# Patient Record
Sex: MALE | Race: WHITE | NOT HISPANIC OR LATINO
[De-identification: names, ages, dates, MRNs, and addresses within clinical notes are randomized per-mention and may not be internally consistent; named-entity substitution may affect disease eponyms.]

---

## 2019-02-04 ENCOUNTER — FORM ENCOUNTER (OUTPATIENT)
Age: 74
End: 2019-02-04

## 2019-02-05 ENCOUNTER — INPATIENT (INPATIENT)
Facility: HOSPITAL | Age: 74
LOS: 2 days | Discharge: HOME HEALTH | DRG: 247 | End: 2019-02-08
Attending: HOSPITALIST | Admitting: STUDENT IN AN ORGANIZED HEALTH CARE EDUCATION/TRAINING PROGRAM
Payer: MEDICARE

## 2019-02-05 VITALS
OXYGEN SATURATION: 97 % | HEART RATE: 43 BPM | SYSTOLIC BLOOD PRESSURE: 183 MMHG | TEMPERATURE: 98 F | DIASTOLIC BLOOD PRESSURE: 90 MMHG | HEIGHT: 67 IN | WEIGHT: 179.9 LBS | RESPIRATION RATE: 18 BRPM

## 2019-02-05 DIAGNOSIS — B34.2 CORONAVIRUS INFECTION, UNSPECIFIED: ICD-10-CM

## 2019-02-05 DIAGNOSIS — I49.9 CARDIAC ARRHYTHMIA, UNSPECIFIED: ICD-10-CM

## 2019-02-05 DIAGNOSIS — Z29.9 ENCOUNTER FOR PROPHYLACTIC MEASURES, UNSPECIFIED: ICD-10-CM

## 2019-02-05 DIAGNOSIS — M10.9 GOUT, UNSPECIFIED: ICD-10-CM

## 2019-02-05 DIAGNOSIS — Z98.89 OTHER SPECIFIED POSTPROCEDURAL STATES: Chronic | ICD-10-CM

## 2019-02-05 DIAGNOSIS — C76.2 MALIGNANT NEOPLASM OF ABDOMEN: Chronic | ICD-10-CM

## 2019-02-05 DIAGNOSIS — G25.0 ESSENTIAL TREMOR: ICD-10-CM

## 2019-02-05 DIAGNOSIS — I10 ESSENTIAL (PRIMARY) HYPERTENSION: ICD-10-CM

## 2019-02-05 DIAGNOSIS — M48.061 SPINAL STENOSIS, LUMBAR REGION WITHOUT NEUROGENIC CLAUDICATION: ICD-10-CM

## 2019-02-05 LAB
ALBUMIN SERPL ELPH-MCNC: 3.9 G/DL — SIGNIFICANT CHANGE UP (ref 3.3–5)
ALP SERPL-CCNC: 148 U/L — HIGH (ref 40–120)
ALT FLD-CCNC: 17 U/L — SIGNIFICANT CHANGE UP (ref 10–45)
ANION GAP SERPL CALC-SCNC: 14 MMOL/L — SIGNIFICANT CHANGE UP (ref 5–17)
APPEARANCE UR: CLEAR — SIGNIFICANT CHANGE UP
AST SERPL-CCNC: 21 U/L — SIGNIFICANT CHANGE UP (ref 10–40)
BILIRUB SERPL-MCNC: 0.3 MG/DL — SIGNIFICANT CHANGE UP (ref 0.2–1.2)
BILIRUB UR-MCNC: NEGATIVE — SIGNIFICANT CHANGE UP
BUN SERPL-MCNC: 14 MG/DL — SIGNIFICANT CHANGE UP (ref 7–23)
CALCIUM SERPL-MCNC: 8.9 MG/DL — SIGNIFICANT CHANGE UP (ref 8.4–10.5)
CHLORIDE SERPL-SCNC: 98 MMOL/L — SIGNIFICANT CHANGE UP (ref 96–108)
CO2 SERPL-SCNC: 25 MMOL/L — SIGNIFICANT CHANGE UP (ref 22–31)
COLOR SPEC: SIGNIFICANT CHANGE UP
CREAT SERPL-MCNC: 1.02 MG/DL — SIGNIFICANT CHANGE UP (ref 0.5–1.3)
DIFF PNL FLD: NEGATIVE — SIGNIFICANT CHANGE UP
GLUCOSE SERPL-MCNC: 108 MG/DL — HIGH (ref 70–99)
GLUCOSE UR QL: NEGATIVE — SIGNIFICANT CHANGE UP
HCOV NL63 RNA SPEC QL NAA+PROBE: DETECTED
HCOV PNL SPEC NAA+PROBE: DETECTED
HCT VFR BLD CALC: 43.8 % — SIGNIFICANT CHANGE UP (ref 39–50)
HGB BLD-MCNC: 14.8 G/DL — SIGNIFICANT CHANGE UP (ref 13–17)
KETONES UR-MCNC: NEGATIVE — SIGNIFICANT CHANGE UP
LEUKOCYTE ESTERASE UR-ACNC: NEGATIVE — SIGNIFICANT CHANGE UP
MCHC RBC-ENTMCNC: 32.3 PG — SIGNIFICANT CHANGE UP (ref 27–34)
MCHC RBC-ENTMCNC: 33.8 GM/DL — SIGNIFICANT CHANGE UP (ref 32–36)
MCV RBC AUTO: 95.6 FL — SIGNIFICANT CHANGE UP (ref 80–100)
NITRITE UR-MCNC: NEGATIVE — SIGNIFICANT CHANGE UP
NT-PROBNP SERPL-SCNC: 391 PG/ML — HIGH (ref 0–300)
PH UR: 6.5 — SIGNIFICANT CHANGE UP (ref 5–8)
PLATELET # BLD AUTO: 197 K/UL — SIGNIFICANT CHANGE UP (ref 150–400)
POTASSIUM SERPL-MCNC: 4.1 MMOL/L — SIGNIFICANT CHANGE UP (ref 3.5–5.3)
POTASSIUM SERPL-SCNC: 4.1 MMOL/L — SIGNIFICANT CHANGE UP (ref 3.5–5.3)
PROT SERPL-MCNC: 7.1 G/DL — SIGNIFICANT CHANGE UP (ref 6–8.3)
PROT UR-MCNC: SIGNIFICANT CHANGE UP
RAPID RVP RESULT: DETECTED
RBC # BLD: 4.58 M/UL — SIGNIFICANT CHANGE UP (ref 4.2–5.8)
RBC # FLD: 13.1 % — SIGNIFICANT CHANGE UP (ref 10.3–14.5)
SODIUM SERPL-SCNC: 137 MMOL/L — SIGNIFICANT CHANGE UP (ref 135–145)
SP GR SPEC: 1.01 — SIGNIFICANT CHANGE UP (ref 1.01–1.02)
TROPONIN T, HIGH SENSITIVITY RESULT: 26 NG/L — SIGNIFICANT CHANGE UP (ref 0–51)
UROBILINOGEN FLD QL: NEGATIVE — SIGNIFICANT CHANGE UP
WBC # BLD: 10.7 K/UL — HIGH (ref 3.8–10.5)
WBC # FLD AUTO: 10.7 K/UL — HIGH (ref 3.8–10.5)

## 2019-02-05 PROCEDURE — 71046 X-RAY EXAM CHEST 2 VIEWS: CPT | Mod: 26

## 2019-02-05 PROCEDURE — 93010 ELECTROCARDIOGRAM REPORT: CPT

## 2019-02-05 PROCEDURE — 99223 1ST HOSP IP/OBS HIGH 75: CPT | Mod: GC

## 2019-02-05 PROCEDURE — 99285 EMERGENCY DEPT VISIT HI MDM: CPT | Mod: 25

## 2019-02-05 RX ORDER — GABAPENTIN 400 MG/1
1200 CAPSULE ORAL THREE TIMES A DAY
Qty: 0 | Refills: 0 | Status: DISCONTINUED | OUTPATIENT
Start: 2019-02-05 | End: 2019-02-08

## 2019-02-05 RX ORDER — PRIMIDONE 250 MG/1
250 TABLET ORAL THREE TIMES A DAY
Qty: 0 | Refills: 0 | Status: DISCONTINUED | OUTPATIENT
Start: 2019-02-05 | End: 2019-02-08

## 2019-02-05 RX ORDER — TAMSULOSIN HYDROCHLORIDE 0.4 MG/1
0.4 CAPSULE ORAL AT BEDTIME
Qty: 0 | Refills: 0 | Status: DISCONTINUED | OUTPATIENT
Start: 2019-02-05 | End: 2019-02-08

## 2019-02-05 RX ORDER — ACETAMINOPHEN 500 MG
650 TABLET ORAL EVERY 6 HOURS
Qty: 0 | Refills: 0 | Status: DISCONTINUED | OUTPATIENT
Start: 2019-02-05 | End: 2019-02-08

## 2019-02-05 RX ORDER — ALLOPURINOL 300 MG
300 TABLET ORAL DAILY
Qty: 0 | Refills: 0 | Status: DISCONTINUED | OUTPATIENT
Start: 2019-02-05 | End: 2019-02-08

## 2019-02-05 RX ORDER — ENOXAPARIN SODIUM 100 MG/ML
40 INJECTION SUBCUTANEOUS EVERY 24 HOURS
Qty: 0 | Refills: 0 | Status: DISCONTINUED | OUTPATIENT
Start: 2019-02-05 | End: 2019-02-08

## 2019-02-05 RX ORDER — DULOXETINE HYDROCHLORIDE 30 MG/1
60 CAPSULE, DELAYED RELEASE ORAL DAILY
Qty: 0 | Refills: 0 | Status: DISCONTINUED | OUTPATIENT
Start: 2019-02-05 | End: 2019-02-08

## 2019-02-05 RX ORDER — ATORVASTATIN CALCIUM 80 MG/1
40 TABLET, FILM COATED ORAL AT BEDTIME
Qty: 0 | Refills: 0 | Status: DISCONTINUED | OUTPATIENT
Start: 2019-02-05 | End: 2019-02-08

## 2019-02-05 RX ADMIN — GABAPENTIN 1200 MILLIGRAM(S): 400 CAPSULE ORAL at 22:05

## 2019-02-05 RX ADMIN — ATORVASTATIN CALCIUM 40 MILLIGRAM(S): 80 TABLET, FILM COATED ORAL at 22:05

## 2019-02-05 RX ADMIN — ENOXAPARIN SODIUM 40 MILLIGRAM(S): 100 INJECTION SUBCUTANEOUS at 22:06

## 2019-02-05 RX ADMIN — PRIMIDONE 250 MILLIGRAM(S): 250 TABLET ORAL at 22:05

## 2019-02-05 RX ADMIN — Medication 300 MILLIGRAM(S): at 22:06

## 2019-02-05 RX ADMIN — TAMSULOSIN HYDROCHLORIDE 0.4 MILLIGRAM(S): 0.4 CAPSULE ORAL at 22:06

## 2019-02-05 NOTE — H&P ADULT - NSHPPHYSICALEXAM_GEN_ALL_CORE
Vital Signs Last 24 Hrs  T(C): 36.6 (05 Feb 2019 20:55), Max: 36.8 (05 Feb 2019 13:30)  T(F): 97.9 (05 Feb 2019 20:55), Max: 98.2 (05 Feb 2019 13:30)  HR: 77 (05 Feb 2019 20:55) (43 - 83)  BP: 135/93 (05 Feb 2019 20:55) (126/63 - 183/90)  BP(mean): --  RR: 15 (05 Feb 2019 20:55) (15 - 18)  SpO2: 98% (05 Feb 2019 20:55) (97% - 100%)  CAPILLARY BLOOD GLUCOSE        I&O's Summary      PHYSICAL EXAM:  GENERAL: NAD, well-developed  HEAD:  Atraumatic, Normocephalic  EYES: EOMI, PERRLA, conjunctiva and sclera clear  ENT: mild pharyngeal erythema, no cervical lymphadenopathy  NECK: Supple, No JVD  CHEST/LUNG: Clear to auscultation bilaterally; No wheeze  HEART: Normal rate, irregular rhythm; No murmurs, rubs, or gallops  ABDOMEN: Soft, Nontender, Nondistended; Bowel sounds present  EXTREMITIES:  2+ Peripheral Pulses, No clubbing, cyanosis, or edema  PSYCH: AAOx3  NEUROLOGY: non-focal, hypersensative to palpation right hand and leg 4/5 strength RU/RLE;  SKIN: No rashes or lesions Vital Signs Last 24 Hrs  T(C): 36.6 (05 Feb 2019 20:55), Max: 36.8 (05 Feb 2019 13:30)  T(F): 97.9 (05 Feb 2019 20:55), Max: 98.2 (05 Feb 2019 13:30)  HR: 77 (05 Feb 2019 20:55) (43 - 83)  BP: 135/93 (05 Feb 2019 20:55) (126/63 - 183/90)  BP(mean): --  RR: 15 (05 Feb 2019 20:55) (15 - 18)  SpO2: 98% (05 Feb 2019 20:55) (97% - 100%)  CAPILLARY BLOOD GLUCOSE        I&O's Summary      PHYSICAL EXAM:  GENERAL: NAD, well-developed  HEAD:  Atraumatic, Normocephalic  EYES: EOMI, PERRLA, conjunctiva and sclera clear  ENT: mild pharyngeal erythema, no cervical lymphadenopathy  NECK: Supple, No JVD  CHEST/LUNG: Clear to auscultation bilaterally; No wheeze  HEART: Normal rate, irregular rhythm; No murmurs, rubs, or gallops  ABDOMEN: Soft, Nontender, Nondistended; Bowel sounds present  EXTREMITIES:  2+ Peripheral Pulses, No clubbing, cyanosis, or edema  PSYCH: AAOx3  NEUROLOGY: non-focal, hypersensative to palpation right hand and leg 4/5 strength RU/RLE;  BacK: well healed surgical incision cervical and lumbar w/ palpable neuromodulators  SKIN: No rashes or lesions Vital Signs Last 24 Hrs  T(C): 36.6 (05 Feb 2019 20:55), Max: 36.8 (05 Feb 2019 13:30)  T(F): 97.9 (05 Feb 2019 20:55), Max: 98.2 (05 Feb 2019 13:30)  HR: 77 (05 Feb 2019 20:55) (43 - 83)  BP: 135/93 (05 Feb 2019 20:55) (126/63 - 183/90)  BP(mean): --  RR: 15 (05 Feb 2019 20:55) (15 - 18)  SpO2: 98% (05 Feb 2019 20:55) (97% - 100%)  CAPILLARY BLOOD GLUCOSE    I&O's Summary      PHYSICAL EXAM:  GENERAL: NAD, well-developed  HEAD:  Atraumatic, Normocephalic  EYES: EOMI, PERRLA, conjunctiva and sclera clear  ENT: mild pharyngeal erythema, no cervical lymphadenopathy  NECK: Supple, No JVD  CHEST/LUNG: Clear to auscultation bilaterally; No wheeze  HEART: Normal rate, irregular rhythm; No murmurs, rubs, or gallops  ABDOMEN: Soft, Nontender, Nondistended; Bowel sounds present  EXTREMITIES:  2+ Peripheral Pulses, No clubbing, cyanosis, or edema  PSYCH: AAOx3  NEUROLOGY: non-focal, hypersensative to palpation right hand and leg 4/5 strength RU/RLE;  BacK: well healed surgical incision cervical and lumbar w/ palpable neuromodulators  SKIN: No rashes or lesions Vital Signs Last 24 Hrs  T(C): 36.6 (05 Feb 2019 20:55), Max: 36.8 (05 Feb 2019 13:30)  T(F): 97.9 (05 Feb 2019 20:55), Max: 98.2 (05 Feb 2019 13:30)  HR: 77 (05 Feb 2019 20:55) (43 - 83)  BP: 135/93 (05 Feb 2019 20:55) (126/63 - 183/90)  BP(mean): --  RR: 15 (05 Feb 2019 20:55) (15 - 18)  SpO2: 98% (05 Feb 2019 20:55) (97% - 100%)  CAPILLARY BLOOD GLUCOSE    I&O's Summary      PHYSICAL EXAM:  GENERAL: NAD, well-developed  HEAD:  Atraumatic, Normocephalic  EYES: EOMI, PERRLA, conjunctiva and sclera clear  ENT: mild pharyngeal erythema, no cervical lymphadenopathy  NECK: Supple, No JVD  CHEST/LUNG: Clear to auscultation bilaterally; No wheeze  HEART: Normal rate, irregular rhythm; No murmurs, rubs, or gallops  ABDOMEN: Soft, Nontender, Nondistended; Bowel sounds present  EXTREMITIES:  2+ Peripheral Pulses, No clubbing, cyanosis, or edema  PSYCH: AAOx3  NEUROLOGY: non-focal, hypersensitive to palpation right hand and leg 4/5 strength RUE/RLE;  BacK: well healed surgical incision cervical and lumbar w/ palpable neuromodulators  SKIN: No rashes or lesions

## 2019-02-05 NOTE — H&P ADULT - PROBLEM SELECTOR PLAN 4
Continue home primidone Patient currently well controlled. unclear if he was taking HTN meds in prison.  -restart losartan as needed. Hold hctz in anticipation of cardiac catheterization.   -monitor BP

## 2019-02-05 NOTE — ED PROVIDER NOTE - CHIEF COMPLAINT
The patient is a 74y Male complaining of The patient is a 74y Male complaining of Left side chest pain

## 2019-02-05 NOTE — H&P ADULT - HISTORY OF PRESENT ILLNESS
74M with PMH of HTN, HLD, JOSSY who present for bradycardia. Patient states that he went to [facility] on Saturday because he was not feeling well with a cough, N/V and runny nose and was found to have a slow heart beat and so he was sent to [facility] for evaluation. While there he was found to have bigeminy and sent over for cardiac angio. The patient states that he has had intermittent palpitations with only 1 episode of chest pain while he was coughing which resolved after about 1 minute. He states that he has never had chest pain before and that there has been no change in his exercise tolerance, he usually walks back and forth around his cell.   He had been in nursing home for 3 months, states his cell mate is sick.   The patient has no history of CAD. His father  of an MI. He is a former smoker. Denies EtOH or drug use. Of note, patient recently underwent nuclear stress test at [facility]. Per chart, it showed a fixed anteroseptal defect and mild inferolateral ischemia.     In ED: 36.7; 43; 183/90->126/63; 97% on RA 74M with PMH of spinal stenosis s/p neuromodulators cervical and lumbar, gout, HTN, HLD, JOSSY who present for bradycardia. Patient states that he went to [facility] on Saturday because he was not feeling well with a cough, N/V and runny nose and was found to have a slow heart beat and so he was sent to [facility] for evaluation. While there he was found to have bigeminy and sent over for cardiac angio. The patient states that he has had intermittent palpitations with only 1 episode of chest pain while he was coughing which resolved after about 1 minute. He states that he has never had chest pain before and that there has been no change in his exercise tolerance, he usually walks back and forth around his cell.   He had been in correction for 3 months, states his cell mate is sick.   The patient has no history of CAD. His father  of an MI. He is a former smoker. Denies EtOH or drug use. Of note, patient recently underwent nuclear stress test at [facility]. Per chart, it showed a fixed anteroseptal defect and mild inferolateral ischemia.     In ED: 36.7; 43; 183/90->126/63; 97% on RA

## 2019-02-05 NOTE — H&P ADULT - ATTENDING COMMENTS
Patient assigned to me by night hospitalist in charge for management and care for patient for this evening only. Care to be resumed by day hospitalist in the morning and thereafter.    This patient is a 74yoM with PMH of spinal stenosis s/p neurostimulator, gout, htn, hLd, JOSSY who initially developed symptoms of cough and rhinorrhea, was found to have bradycardia, and reversible perfusion defects on nuclear stress test. Patient endorses nasal congestion. He denies CP, palpitations, lightheadedness at this time. On exam, pt is A&O x3, PERRL, EOMI, lungs CTAB, cardiac exam regularly irregular, abd soft and nontender, no peripheral edema.  Agree with plan above for left heart catheterization in AM to assess for vasoocclusive disease.

## 2019-02-05 NOTE — H&P ADULT - PROBLEM SELECTOR PLAN 6
Patient w/ hx of nerve stimulator on duloxetine and gabapentin for neuropathic pain  -continue home duloxetine and gabapentin Continue home allopurinol

## 2019-02-05 NOTE — ED PROVIDER NOTE - ATTENDING CONTRIBUTION TO CARE
Private Physician Referred To [facility]lam[facility] from [facility]  74y male pmh BPH, Gout, HTN.HLD,JOSSY nocompliant on CPAP, Pt comes to ed from [facility] in [place]/Prisoner. Pt had been admitted to [facility] last month.  DC home now comes to ed complains of "I've been very sick with a cold past several days with cough,nv" Had been seen again by md and dx as Lesly Had Sestamibi scan 1/2/19 Showing mild infrolateral ischemia. Pt referred to Ed for eval. Pt had left chest pain, nonraditating lasting few minutes. Now pain free. PE WDWN male awake normocephalic atraumatic chest clear anterior & posterior cv no rubs, gallops or murmurs, neruo no focal defects cv no rubs, gallops or murmurs   Jenaro Silva MD, Facep

## 2019-02-05 NOTE — H&P ADULT - PROBLEM SELECTOR PLAN 7
DVT: enoxaparin  Diet: DASH  Dispo: cath DVT: enoxaparin  Diet: DASH  Pending cath Patient w/ hx of nerve stimulator on duloxetine and gabapentin for neuropathic pain  -continue home duloxetine and gabapentin

## 2019-02-05 NOTE — H&P ADULT - PROBLEM SELECTOR PLAN 2
Patient w/ viral symptoms w/ RVP +, no leukocytosis and no signs of pna; states that [facility]  -will get CXR, if no focal consolidation will monitor off antibx  -likely viral syndrome  -symptoms management Patient w/ viral symptoms w/ RVP +, no leukocytosis and no signs of pna; states that he received 2 days of amoxicillin-clavulanate in [facility].  -likely viral syndrome  -symptoms management -monitor on tele  -cath in AM  - follow up cardiology recommendations

## 2019-02-05 NOTE — H&P ADULT - NSHPREVIEWOFSYSTEMS_GEN_ALL_CORE
REVIEW OF SYSTEMS:  Constitutional: [ X] negative [ ] fevers [ ] chills [ ] weight loss [ ] weight gain  HEENT: [ ] negative [ ] dry eyes [ ] eye irritation [ ] postnasal drip [X ] nasal congestion  CV: [ ] negative  [ ] chest pain [ ] orthopnea [X ] palpitations [ ] murmur  Resp: [ ] negative [X ] cough [ ] shortness of breath [ ] dyspnea [ ] wheezing [ ] sputum [ ] hemoptysis  GI: [ X] negative [ ] nausea [ ] vomiting [ ] diarrhea [ ] constipation [ ] abd pain [ ] dysphagia   : [X ] negative [ ] dysuria [ ] nocturia [ ] hematuria [ ] increased urinary frequency  Musculoskeletal: [ ] negative [ X] back pain [ ] myalgias [ ] arthralgias [ ] fracture  Skin: [ X] negative [ ] rash [ ] itch  Neurological: [ X] negative [ ] headache [ ] dizziness [ ] syncope [ ] weakness [ ] numbness  Psychiatric: [X ] negative [ ] anxiety [ ] depression  Endocrine: [X ] negative [ ] diabetes [ ] thyroid problem  Hematologic/Lymphatic: [ X] negative [ ] anemia [ ] bleeding problem  Allergic/Immunologic: [X ] negative [ ] itchy eyes [ ] nasal discharge [ ] hives [ ] angioedema  [ ] All other systems negative  [ ] Unable to assess ROS because ________

## 2019-02-05 NOTE — ED PROVIDER NOTE - PSH
Cervical disc disorder  anterior cervical diskectomy C6-C7 2001 2005 posterior cervical cecompression and foraminectomy fusion and plating  8/2005 anterior cervical diskectomy and removal of plate and replated with fustion   2007 cervical laminectomy  H/O Spinal Surgery  2007 Placement of battery pack for neurostimulator  History of laminectomy  2007 Cervical laminectomy for neurostimulator  History of lumbar laminectomy  2012 L4-L5  Hx of LASIK  6 years ago  Malignant melanoma of abdomen  2014 - s/p excision  Neuropathy of hand  s/p insertion of neurostimulator in 2007 secondary to neuropathy in the RIGHT wrist/forearm

## 2019-02-05 NOTE — H&P ADULT - PROBLEM SELECTOR PLAN 3
Patient currently well controlled. unclear if he was taking HTN meds in prison.  -restart HCTZ-losartan as needed  -monitor BP Patient currently well controlled. unclear if he was taking HTN meds in prison.  -restart losartan as needed. Hold hctz in anticipation of cardiac catheterization.   -monitor BP Patient w/ viral symptoms w/ RVP +, no leukocytosis and no signs of pna; states that he received 2 days of amoxicillin-clavulanate in [facility].  -likely viral syndrome  -symptoms management

## 2019-02-05 NOTE — H&P ADULT - NSHPLABSRESULTS_GEN_ALL_CORE
LABS:                        14.8   10.7  )-----------( 197      ( 2019 13:50 )             43.8     02-    137  |  98  |  14  ----------------------------<  108<H>  4.1   |  25  |  1.02    Ca    8.9      2019 13:50    TPro  7.1  /  Alb  3.9  /  TBili  0.3  /  DBili  x   /  AST  21  /  ALT  17  /  AlkPhos  148<H>  02-05          Urinalysis Basic - ( 2019 17:57 )    Color: Light Yellow / Appearance: Clear / S.010 / pH: x  Gluc: x / Ketone: Negative  / Bili: Negative / Urobili: Negative   Blood: x / Protein: Trace / Nitrite: Negative   Leuk Esterase: Negative / RBC: x / WBC x   Sq Epi: x / Non Sq Epi: x / Bacteria: x     EKG: sinus 87 bpm w/ bigeminy LABS:  Labs, EKG reviewed personally by me                14.8   10.7  )-----------( 197      ( 2019 13:50 )             43.8         137  |  98  |  14  ----------------------------<  108<H>  4.1   |  25  |  1.02    Ca    8.9      2019 13:50    TPro  7.1  /  Alb  3.9  /  TBili  0.3  /  DBili  x   /  AST  21  /  ALT  17  /  AlkPhos  148<H>        Urinalysis Basic - ( 2019 17:57 )    Color: Light Yellow / Appearance: Clear / S.010 / pH: x  Gluc: x / Ketone: Negative  / Bili: Negative / Urobili: Negative   Blood: x / Protein: Trace / Nitrite: Negative   Leuk Esterase: Negative / RBC: x / WBC x   Sq Epi: x / Non Sq Epi: x / Bacteria: x     EKG: sinus 87 bpm w/ bigeminy    < from: Xray Chest 2 Views PA/Lat (19 @ 14:26) >    INTERPRETATION:  CLINICAL INFORMATION: Chest pain.    TECHNIQUE: Frontal and lateral radiographs of the chest dated 2019   2:26 PM.    COMPARISON: Chest radiograph dated 2014.    FINDINGS:  Thin catheter projecting over the right hemithorax.  The lungs are clear.  No pleural effusions. No pneumothorax.  Redemonstrated elevation of the left hemidiaphragm, similar to prior.  Cardiac size is withinnormal limits.   No acute osseous abnormality. Cervical fixation hardware again noted.      IMPRESSION: Clear lungs. No pneumothorax.    < end of copied text >        Cardiology consult note reviewed by me.

## 2019-02-05 NOTE — CONSULT NOTE ADULT - ATTENDING COMMENTS
Patient interviewed and examined. I was physically present for the essential portions of the E/M service provided.  Chart reviewed and note edited where appropriate.  Case discussed with fellow.  Agree w/ Assessment and Plan as outlined.  VEBs do not perfuse and he may have an effective bradycardia. With normal ECG, doubt ischemic etiology.  Suspect papillary muscle or annular origin.   Agree w/ cath.   Ambulate on tele to see if VEBs suppress.   LIkely initial Rx w/ BB post -cath    Gaston Cho MD [facility]  Spectra:  [phone]  Office: [phone]

## 2019-02-05 NOTE — ED PROVIDER NOTE - OBJECTIVE STATEMENT
Pt is a 73YO Male PMH listed below presenting after feeling episode of L.sided CP this morning when he got up. Pt says that he had a similar episode 1 month ago for which he went to [facility]. Denies exercise intolerance, radiation of pain, difficulty breathing lying flat. Pain was dull in nature. Nothing makes it worse, but it randomly went away. Pt. says that he feels fine now. Says that he was there for 5 days. ECHO was done which showed LVEF 50% w/ mildly decreased global LV systolic funtion, Impaired relaxation of LV diastolic filling, trivial pericardial effusion, mild-mod tricuspid/aortic regurge, mild elevated Pulm artery systolic pressure. Pt also had a Sestamibi scan which showed focal anteroseptal predominantly fixed perfusion abnormality w/ adjacent area of mild foal anteroseptal ischemia. Mild inferolateral ischemia. On 2/3/19 pt had an EKG that showed PVC with bigeminy. Same reuslts were seen on EKG today. Since then he has felt fine other than a cold that he has had for the past 3 days. Says that he coughs, has a HA, difficulty breathing, congestion, fatigue, NV. Denies current CP, SOB, HA, dizziness, palpitations, NVD, urianry symptoms, abdominal pain, leg swelling.

## 2019-02-05 NOTE — ED PROVIDER NOTE - NS ED ROS FT
Review of Systems:   Constitutional:  Denies fever, weight loss  HEENT:  Denies blurry vision, visual disturbances, difficulty hearing, epistaxis, sore throat  Neck: Denies pain, stiffness  Respiratory: HPI  Cardiovascular: HPI  Gastrointestinal: Denies abdominal pain, nausea, vomiting, diarrhea, constipation, hematochezia or hematemesis  Genitourinary: Denies dysuria, hematuria, back pain, frequency, hesitancy.    Neurological: Denies headache, numbness, weakness  Skin: Denies itching, burning, rashes, lesions  Endocrine: Denies heat or cold intolerance  Musculoskeletal:  Denies joint pain or swelling.  Denies muscle, back or extremity pain  Psychiatric: Denies depression, anxiety, mood swings  Heme/Lymph: Denies enlarged glands, easy bruising, bleeding gums, hx of blood clots  Allergy:  Denies history of hives or eczema

## 2019-02-05 NOTE — CONSULT NOTE ADULT - SUBJECTIVE AND OBJECTIVE BOX
CHIEF COMPLAINT: chest pain    HISTORY OF PRESENT ILLNESS:      Allergies  No Known Allergies      MEDICATIONS:                  PAST MEDICAL & SURGICAL HISTORY:  Spondylolisthesis  Familial tremor  Obesity (BMI 30-39.9)  Drug dependence: Chronic pain - opioid dependence, has completed suboxone therapy  - no opioids for last 5 months  Ventral hernia  Cervical arthritis  HTN (hypertension)  Testosterone deficiency  BPH (benign prostatic hypertrophy)  Hyperlipidemia  Gout  JOSSY (obstructive sleep apnea): noncompliant with cpap  Spinal stenosis of lumbar region  Deviated septum  Nasal polyps  Malignant melanoma of abdomen: 2014 - s/p excision  History of lumbar laminectomy: 2012 L4-L5  H/O Spinal Surgery: 2007 Placement of battery pack for neurostimulator  History of laminectomy: 2007 Cervical laminectomy for neurostimulator  Hx of LASIK: 6 years ago  Cervical disc disorder: anterior cervical diskectomy C6-C7 2001 2005 posterior cervical cecompression and foraminectomy fusion and plating  8/2005 anterior cervical diskectomy and removal of plate and replated with fustion   2007 cervical laminectomy  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  Skin: no rashes.  Ophthalmologic: no blurred vision, no loss of vision. 	  ENT: no sore throat, rhinorrhea, sinus congestion.  Respiratory: no SOB, cough or wheeze.  Gastrointestinal:  no N/V/D, no melena/hematemesis/hematochezia.  Genitourinary: no dysuria/hesitancy or hematuria.  Musculoskeletal: no myalgias or arthralgias.  Neurological: no changes in vision or hearing, no lightheadedness/dizziness, no syncope/near syncope	  Psychiatric: no unusual stress/anxiety.   Hematology/Lymphatics: no unusual bleeding, bruising and no lymphadenopathy.  Endocrine: no unusual thirst.   All others negative except as stated above and in HPI.    PHYSICAL EXAM:  T(C): 36.7 (02-05-19 @ 18:00), Max: 36.8 (02-05-19 @ 13:30)  HR: 76 (02-05-19 @ 18:00) (43 - 83)  BP: 132/67 (02-05-19 @ 18:00) (126/63 - 183/90)  RR: 18 (02-05-19 @ 18:00) (18 - 18)  SpO2: 100% (02-05-19 @ 18:00) (97% - 100%)  Wt(kg): --  I&O's Summary      Appearance: no acute distress  HEENT:   Normal oral mucosa, PERRL, EOMI	  Lymphatic: No lymphadenopathy  Cardiovascular: RRR, Normal S1 S2, No JVD, No murmurs, No edema  Respiratory: Lungs clear to auscultation	  Psychiatry: A & O x 3, Mood & affect appropriate  Gastrointestinal:  Soft, Non-tender, + BS	  Skin: No rashes, No ecchymoses, No cyanosis	  Neurologic: Non-focal  Extremities: Normal range of motion, No clubbing, cyanosis or edema  Vascular: Peripheral pulses palpable 2+ bilaterally      LABS:	 	    CBC Full  -  ( 05 Feb 2019 13:50 )  WBC Count : 10.7 K/uL  Hemoglobin : 14.8 g/dL  Hematocrit : 43.8 %  Platelet Count - Automated : 197 K/uL  Mean Cell Volume : 95.6 fl  Mean Cell Hemoglobin : 32.3 pg  Mean Cell Hemoglobin Concentration : 33.8 gm/dL  Auto Neutrophil # : x  Auto Lymphocyte # : x  Auto Monocyte # : x  Auto Eosinophil # : x  Auto Basophil # : x  Auto Neutrophil % : x  Auto Lymphocyte % : x  Auto Monocyte % : x  Auto Eosinophil % : x  Auto Basophil % : x    02-05    137  |  98  |  14  ----------------------------<  108<H>  4.1   |  25  |  1.02    Ca    8.9      05 Feb 2019 13:50    TPro  7.1  /  Alb  3.9  /  TBili  0.3  /  DBili  x   /  AST  21  /  ALT  17  /  AlkPhos  148<H>  02-05      proBNP: Serum Pro-Brain Natriuretic Peptide: 391 pg/mL (02-05 @ 13:50)    Lipid Profile:   HgA1c:   TSH:       CARDIAC MARKERS:            TELEMETRY: 	    EKG:  	  RADIOLOGY:    	  ASSESSMENT/PLAN:    #Bigeminy  - Admit to [facility] bed, cardiology will follow  - Check TTE  - Will plan on [facility] 2/6 to rule out ischemia	      JOVANNY Ascencio  Cardiology Fellow   [id number] CHIEF COMPLAINT: chest pain    HISTORY OF PRESENT ILLNESS:  74M with PMH of HTN, HLD, JOSSY who presents from [facility] for evaluation of chest pain and abnormal EKG. Since  patient has had cough, rhinorrhea congestion, emesis and malaise. He notes having sick cellmates. Today he had a coughing episode and afterwards experienced a one minute episode of non-radiating chest pain that resolved spontaneously. He was evaluated in the care home and found to be bradycardic on exam. An EKG was performed and showed bigeminy and he was sent to the ED for further evaluation.     The patient has no history of CAD. His father  of an MI. He is a former smoker. Denies EtOH or drug use. Of note, patient recently underwent nuclear stress test at [facility]. Per chart, it showed a fixed anteroseptal defect and mild inferolateral ischemia.     Allergies  No Known Allergies    MEDICATIONS:      PAST MEDICAL & SURGICAL HISTORY:  Spondylolisthesis  Familial tremor  Obesity (BMI 30-39.9)  Drug dependence: Chronic pain - opioid dependence, has completed suboxone therapy  - no opioids for last 5 months  Ventral hernia  Cervical arthritis  HTN (hypertension)  Testosterone deficiency  BPH (benign prostatic hypertrophy)  Hyperlipidemia  Gout  JOSSY (obstructive sleep apnea): noncompliant with cpap  Spinal stenosis of lumbar region  Deviated septum  Nasal polyps  Malignant melanoma of abdomen:  - s/p excision  History of lumbar laminectomy:  L4-L5  H/O Spinal Surgery:  Placement of battery pack for neurostimulator  History of laminectomy:  Cervical laminectomy for neurostimulator  Hx of LASIK: 6 years ago  Cervical disc disorder: anterior cervical diskectomy C6-C7 2001 posterior cervical cecompression and foraminectomy fusion and plating  2005 anterior cervical diskectomy and removal of plate and replated with fustion    cervical laminectomy  Neuropathy of hand: s/p insertion of neurostimulator in  secondary to neuropathy in the RIGHT wrist/forearm      FAMILY HISTORY:  No pertinent family history in first degree relatives      SOCIAL HISTORY:    Non-smoker  Denies EtOH, illicit drugs    REVIEW OF SYSTEMS:  General: no fatigue/malaise, weight loss/gain.  Skin: no rashes.  Ophthalmologic: no blurred vision, no loss of vision. 	  ENT: no sore throat, rhinorrhea, sinus congestion.  Respiratory: see above  Gastrointestinal:  no N/V/D, no melena/hematemesis/hematochezia.  Genitourinary: no dysuria/hesitancy or hematuria.  Musculoskeletal: no myalgias or arthralgias.  Neurological: no changes in vision or hearing, no lightheadedness/dizziness, no syncope/near syncope	  Psychiatric: no unusual stress/anxiety.   Hematology/Lymphatics: no unusual bleeding, bruising and no lymphadenopathy.  Endocrine: no unusual thirst.   All others negative except as stated above and in HPI.    PHYSICAL EXAM:  T(C): 36.7 (19 @ 18:00), Max: 36.8 (19 @ 13:30)  HR: 76 (19 @ 18:00) (43 - 83)  BP: 132/67 (19 @ 18:00) (126/63 - 183/90)  RR: 18 (19 @ 18:00) (18 - 18)  SpO2: 100% (19 @ 18:00) (97% - 100%)  Wt(kg): --  I&O's Summary      Appearance: no acute distress  HEENT:   Normal oral mucosa, PERRL, EOMI	  Lymphatic: No lymphadenopathy  Cardiovascular: Regularly irregular, Normal S1 S2, No JVD, No murmurs, No edema  Respiratory: Lungs clear to auscultation	  Psychiatry: A & O x 3, Mood & affect appropriate  Gastrointestinal:  Soft, Non-tender, + BS	  Skin: No rashes, No ecchymoses, No cyanosis	  Neurologic: Non-focal  Extremities: Normal range of motion, No clubbing, cyanosis or edema  Vascular: Peripheral pulses palpable 2+ bilaterally      LABS:	 	    CBC Full  -  ( 2019 13:50 )  WBC Count : 10.7 K/uL  Hemoglobin : 14.8 g/dL  Hematocrit : 43.8 %  Platelet Count - Automated : 197 K/uL  Mean Cell Volume : 95.6 fl  Mean Cell Hemoglobin : 32.3 pg  Mean Cell Hemoglobin Concentration : 33.8 gm/dL  Auto Neutrophil # : x  Auto Lymphocyte # : x  Auto Monocyte # : x  Auto Eosinophil # : x  Auto Basophil # : x  Auto Neutrophil % : x  Auto Lymphocyte % : x  Auto Monocyte % : x  Auto Eosinophil % : x  Auto Basophil % : x        137  |  98  |  14  ----------------------------<  108<H>  4.1   |  25  |  1.02    Ca    8.9      2019 13:50    TPro  7.1  /  Alb  3.9  /  TBili  0.3  /  DBili  x   /  AST  21  /  ALT  17  /  AlkPhos  148<H>        proBNP: Serum Pro-Brain Natriuretic Peptide: 391 pg/mL ( @ 13:50)    Lipid Profile:   HgA1c:   TSH:       CARDIAC MARKERS:    HS trop 26    ProBNP 391       EKG: Bigeminy, no evidence of ischemia    RADIOLOGY:  < from: Xray Chest 2 Views PA/Lat (19 @ 14:26) >    IMPRESSION: Clear lungs. No pneumothorax.    < end of copied text >    	  ASSESSMENT/PLAN:  74M with PMH of HTN, HLD, JOSSY who presents from [facility] for evaluation of chest pain and abnormal EKG. Endorses URI symptoms, found to be coronavirus positive. Chest pain was in setting of coughing and he is currently CP free. EKG with bigeminy. Cardiac biomarkers negative. This is not ACS. Recommend admission and further work-up of bigeminy.    #Bigeminy  - Admit to tele bed, cardiology will follow  - Check TSH, lipid panel, Hgb A1c, coags  - Replete electrolytes PRN  - Check TTE  - Will plan on [facility]  to rule out ischemia given abnormal NST      JOVANNY Ascencio  Cardiology Fellow   [id number]

## 2019-02-05 NOTE — ED PROVIDER NOTE - PMH
BPH (benign prostatic hypertrophy)    Cervical arthritis    Deviated septum    Drug dependence  Chronic pain - opioid dependence, has completed suboxone therapy  - no opioids for last 5 months  Familial tremor    Gout    HTN (hypertension)    Hyperlipidemia    Nasal polyps    Obesity (BMI 30-39.9)    JOSSY (obstructive sleep apnea)  noncompliant with cpap  Spinal stenosis of lumbar region    Spondylolisthesis    Testosterone deficiency    Ventral hernia

## 2019-02-05 NOTE — H&P ADULT - ASSESSMENT
74M with PMH of HTN, HLD, JOSSY who present for bradycardia found to have new onset bigeminy with atypical symptoms concerning for CAD. 74M with PMH of spinal stenosis s/p neuromodulators cervical and lumbar, gout, HTN, HLD, JOSSY who present for bradycardia found to have new onset bigeminy with atypical symptoms concerning for CAD. 74M with PMH of spinal stenosis s/p neuromodulators cervical and lumbar, gout, HTN, HLD, JOSSY who present for bradycardia found to have new onset bigeminy with atypical symptoms concerning for ischemic CAD, admitted with plan for left heart cardiac catheterization.

## 2019-02-05 NOTE — PATIENT PROFILE ADULT - NSPROREFERSVCHOMEMUSCULO_GEN_A_NUR
Last refill-5/27/16 for a year    Last OV-5/30/17      Per 5/30 dict-Problem started: he has ongoing issues with constipation since childhood.  They have used Miralax in the past, but didn't work      Is pt taking qd?  
Rx done  
no

## 2019-02-05 NOTE — ED PROVIDER NOTE - PROGRESS NOTE DETAILS
Discussed with Dr Tiwari Cards/[facility], pt sent from [facility] not [facility] had been seen pervioulsy there with ischemia rec admit/cath  Jenaro Silva MD, Facep Endorsed to Dr Jonathon Silva MD, Facep

## 2019-02-05 NOTE — H&P ADULT - PROBLEM SELECTOR PLAN 1
New onset bigeminy with atypical symptoms concerning for CAD, no signs of ACS  -f/u cardiology recs, Check TSH, lipid panel, Hgb A1c, coags  - Check TTE  -monitor on tele New onset bigeminy with atypical symptoms concerning for CAD, no signs of ACS  -f/u cardiology recs, Check TSH, lipid panel, Hgb A1c, coags  - Check TTE  -monitor on tele  -cath in AM New onset bigeminy with atypical symptoms w/ mild ischemic changes on nuclear stress test concerning for CAD, no signs of ACS  -f/u cardiology recs, Check TSH, lipid panel, Hgb A1c, coags  - Check TTE  -monitor on tele  -cath in AM New onset bigeminy with atypical symptoms w/ mild ischemic changes on nuclear stress test concerning for CAD, no signs of ACS  -f/u cardiology recs, Check TSH, lipid panel, Hgb A1c, coags  - Check TTE  -monitor on tele  -cath in AM  - follow up cardiology recommendations New onset bigeminy with atypical symptoms w/ mild ischemic changes on nuclear stress test concerning for CAD, no signs of ACS  -f/u cardiology recs, Check TSH, lipid panel, Hgb A1c, coags  - Check TTE

## 2019-02-05 NOTE — ED PROVIDER NOTE - PHYSICAL EXAMINATION
Physical Exam:   General: sitting comfortably in bed, NAD   Neck: supple, full ROM, no lymphadenopathy  CV: RR S1S2  Lungs: CTA b/l, good air flow b/l   Back: No CVA tenderness  Abd: NTND, normal bowel sounds  Ext: NT b/l, no edema

## 2019-02-05 NOTE — ED ADULT NURSE REASSESSMENT NOTE - NS ED NURSE REASSESS COMMENT FT1
Spoke to MD Chen about patient's plan of care. Cardiology came to speak to patient about being admitted and going for a catheterization tomorrow. Spoke to patient about plan of care and he verbalizes understanding. Patient is resting comfortably in bed watching TV. Vital signs are stable and cardiac monitor is placed on patient showing Sinus rhythm with Bigeminy. Patient is in NAD. Will continue to monitor.

## 2019-02-05 NOTE — ED ADULT NURSE NOTE - OBJECTIVE STATEMENT
74 year old male comes to the ED from [facility] for cardiac consult/ possible cardiac catheterization. Patient had EKG done yesterday that showed bigeminy. Patient reports he has been feeling right sided chest pain for about a month, but upon assessment patient denies having chest pain. Patient reports the pain does not radiate anywhere els. Patient was immediately placed on the cardiac monitor and had a heart rate of 83, Sinus rhythm with bigeminy. Patient has a past medical history of HTN, Gout, BPH, HDL and spinal stenosis. Upon assessment, patient is a/ox3 and in NAD. Patient's vital signs are stable. Patient's lung sounds are clear and equal b/l with no labored breathing noted. Patient's abdomen is soft and round, nondistended and nontender upon palpation. Patient's peripheral pulses are strong and equal b/l to both upper and lower extremitied; no edema is present. Patients heart sounds are within normal range and no gallops or murmurs are heard. Patient's skin is warm, dry and intact and normal for race. Patient denies having chest pain/SOB/numbness/tingling at this time. Patient denies fever/chills/nausea/vomiting. Patient denies difficulty urinating and denies having blood in the urine or stool. Patient has been place on a cardiac monitor showing NSR with ventricular beats of Bigeminy. Patient has a 20G IV placed in his right AC and labs have been drawn. Patient is awaiting a cardiology consult. Comfort and saftey measures have been implemented.

## 2019-02-06 DIAGNOSIS — R94.39 ABNORMAL RESULT OF OTHER CARDIOVASCULAR FUNCTION STUDY: ICD-10-CM

## 2019-02-06 LAB
ALBUMIN SERPL ELPH-MCNC: 3.6 G/DL — SIGNIFICANT CHANGE UP (ref 3.3–5)
ALP SERPL-CCNC: 139 U/L — HIGH (ref 40–120)
ALT FLD-CCNC: 17 U/L — SIGNIFICANT CHANGE UP (ref 10–45)
ANION GAP SERPL CALC-SCNC: 14 MMOL/L — SIGNIFICANT CHANGE UP (ref 5–17)
APTT BLD: 29.8 SEC — SIGNIFICANT CHANGE UP (ref 27.5–36.3)
AST SERPL-CCNC: 15 U/L — SIGNIFICANT CHANGE UP (ref 10–40)
BASOPHILS # BLD AUTO: 0.02 K/UL — SIGNIFICANT CHANGE UP (ref 0–0.2)
BASOPHILS NFR BLD AUTO: 0.2 % — SIGNIFICANT CHANGE UP (ref 0–2)
BILIRUB SERPL-MCNC: 0.2 MG/DL — SIGNIFICANT CHANGE UP (ref 0.2–1.2)
BLD GP AB SCN SERPL QL: NEGATIVE — SIGNIFICANT CHANGE UP
BUN SERPL-MCNC: 15 MG/DL — SIGNIFICANT CHANGE UP (ref 7–23)
CALCIUM SERPL-MCNC: 8.9 MG/DL — SIGNIFICANT CHANGE UP (ref 8.4–10.5)
CHLORIDE SERPL-SCNC: 102 MMOL/L — SIGNIFICANT CHANGE UP (ref 96–108)
CHOLEST SERPL-MCNC: 133 MG/DL — SIGNIFICANT CHANGE UP (ref 10–199)
CO2 SERPL-SCNC: 26 MMOL/L — SIGNIFICANT CHANGE UP (ref 22–31)
CREAT SERPL-MCNC: 0.96 MG/DL — SIGNIFICANT CHANGE UP (ref 0.5–1.3)
EOSINOPHIL # BLD AUTO: 0.55 K/UL — HIGH (ref 0–0.5)
EOSINOPHIL NFR BLD AUTO: 6.1 % — HIGH (ref 0–6)
GLUCOSE SERPL-MCNC: 92 MG/DL — SIGNIFICANT CHANGE UP (ref 70–99)
HBA1C BLD-MCNC: 5.4 % — SIGNIFICANT CHANGE UP (ref 4–5.6)
HCT VFR BLD CALC: 45.5 % — SIGNIFICANT CHANGE UP (ref 39–50)
HDLC SERPL-MCNC: 29 MG/DL — LOW
HGB BLD-MCNC: 14.8 G/DL — SIGNIFICANT CHANGE UP (ref 13–17)
IMM GRANULOCYTES NFR BLD AUTO: 0.4 % — SIGNIFICANT CHANGE UP (ref 0–1.5)
INR BLD: 1.03 RATIO — SIGNIFICANT CHANGE UP (ref 0.88–1.16)
LIPID PNL WITH DIRECT LDL SERPL: 59 MG/DL — SIGNIFICANT CHANGE UP
LYMPHOCYTES # BLD AUTO: 2.07 K/UL — SIGNIFICANT CHANGE UP (ref 1–3.3)
LYMPHOCYTES # BLD AUTO: 23 % — SIGNIFICANT CHANGE UP (ref 13–44)
MAGNESIUM SERPL-MCNC: 1.9 MG/DL — SIGNIFICANT CHANGE UP (ref 1.6–2.6)
MCHC RBC-ENTMCNC: 31.6 PG — SIGNIFICANT CHANGE UP (ref 27–34)
MCHC RBC-ENTMCNC: 32.5 GM/DL — SIGNIFICANT CHANGE UP (ref 32–36)
MCV RBC AUTO: 97 FL — SIGNIFICANT CHANGE UP (ref 80–100)
MONOCYTES # BLD AUTO: 1.13 K/UL — HIGH (ref 0–0.9)
MONOCYTES NFR BLD AUTO: 12.5 % — SIGNIFICANT CHANGE UP (ref 2–14)
NEUTROPHILS # BLD AUTO: 5.2 K/UL — SIGNIFICANT CHANGE UP (ref 1.8–7.4)
NEUTROPHILS NFR BLD AUTO: 57.8 % — SIGNIFICANT CHANGE UP (ref 43–77)
PHOSPHATE SERPL-MCNC: 3.5 MG/DL — SIGNIFICANT CHANGE UP (ref 2.5–4.5)
PLATELET # BLD AUTO: 209 K/UL — SIGNIFICANT CHANGE UP (ref 150–400)
POTASSIUM SERPL-MCNC: 3.8 MMOL/L — SIGNIFICANT CHANGE UP (ref 3.5–5.3)
POTASSIUM SERPL-SCNC: 3.8 MMOL/L — SIGNIFICANT CHANGE UP (ref 3.5–5.3)
PROT SERPL-MCNC: 6.6 G/DL — SIGNIFICANT CHANGE UP (ref 6–8.3)
PROTHROM AB SERPL-ACNC: 11.7 SEC — SIGNIFICANT CHANGE UP (ref 10–13.1)
RBC # BLD: 4.69 M/UL — SIGNIFICANT CHANGE UP (ref 4.2–5.8)
RBC # FLD: 14.4 % — SIGNIFICANT CHANGE UP (ref 10.3–14.5)
RH IG SCN BLD-IMP: POSITIVE — SIGNIFICANT CHANGE UP
SODIUM SERPL-SCNC: 142 MMOL/L — SIGNIFICANT CHANGE UP (ref 135–145)
T3 SERPL-MCNC: 112 NG/DL — SIGNIFICANT CHANGE UP (ref 80–200)
T4 AB SER-ACNC: 5.5 UG/DL — SIGNIFICANT CHANGE UP (ref 4.6–12)
TOTAL CHOLESTEROL/HDL RATIO MEASUREMENT: 4.6 RATIO — SIGNIFICANT CHANGE UP (ref 3.4–9.6)
TRIGL SERPL-MCNC: 225 MG/DL — HIGH (ref 10–149)
TSH SERPL-MCNC: 0.97 UIU/ML — SIGNIFICANT CHANGE UP (ref 0.27–4.2)
WBC # BLD: 9.01 K/UL — SIGNIFICANT CHANGE UP (ref 3.8–10.5)
WBC # FLD AUTO: 9.01 K/UL — SIGNIFICANT CHANGE UP (ref 3.8–10.5)

## 2019-02-06 PROCEDURE — 99152 MOD SED SAME PHYS/QHP 5/>YRS: CPT | Mod: GC

## 2019-02-06 PROCEDURE — 92933 PRQ TRLML C ATHRC ST ANGIOP1: CPT | Mod: RC,GC

## 2019-02-06 PROCEDURE — 99233 SBSQ HOSP IP/OBS HIGH 50: CPT

## 2019-02-06 PROCEDURE — 99232 SBSQ HOSP IP/OBS MODERATE 35: CPT | Mod: GC

## 2019-02-06 PROCEDURE — 93010 ELECTROCARDIOGRAM REPORT: CPT

## 2019-02-06 PROCEDURE — 93458 L HRT ARTERY/VENTRICLE ANGIO: CPT | Mod: 26,59,GC

## 2019-02-06 RX ORDER — LOSARTAN POTASSIUM 100 MG/1
100 TABLET, FILM COATED ORAL DAILY
Qty: 0 | Refills: 0 | Status: DISCONTINUED | OUTPATIENT
Start: 2019-02-06 | End: 2019-02-08

## 2019-02-06 RX ORDER — CLOPIDOGREL BISULFATE 75 MG/1
75 TABLET, FILM COATED ORAL DAILY
Qty: 0 | Refills: 0 | Status: DISCONTINUED | OUTPATIENT
Start: 2019-02-07 | End: 2019-02-08

## 2019-02-06 RX ORDER — ASPIRIN/CALCIUM CARB/MAGNESIUM 324 MG
81 TABLET ORAL DAILY
Qty: 0 | Refills: 0 | Status: DISCONTINUED | OUTPATIENT
Start: 2019-02-07 | End: 2019-02-08

## 2019-02-06 RX ORDER — LISINOPRIL 2.5 MG/1
5 TABLET ORAL DAILY
Qty: 0 | Refills: 0 | Status: DISCONTINUED | OUTPATIENT
Start: 2019-02-06 | End: 2019-02-06

## 2019-02-06 RX ORDER — FLUTICASONE PROPIONATE 50 MCG
1 SPRAY, SUSPENSION NASAL
Qty: 0 | Refills: 0 | Status: DISCONTINUED | OUTPATIENT
Start: 2019-02-06 | End: 2019-02-08

## 2019-02-06 RX ADMIN — PRIMIDONE 250 MILLIGRAM(S): 250 TABLET ORAL at 05:21

## 2019-02-06 RX ADMIN — TAMSULOSIN HYDROCHLORIDE 0.4 MILLIGRAM(S): 0.4 CAPSULE ORAL at 23:16

## 2019-02-06 RX ADMIN — Medication 200 MILLIGRAM(S): at 23:16

## 2019-02-06 RX ADMIN — GABAPENTIN 1200 MILLIGRAM(S): 400 CAPSULE ORAL at 23:16

## 2019-02-06 RX ADMIN — ENOXAPARIN SODIUM 40 MILLIGRAM(S): 100 INJECTION SUBCUTANEOUS at 23:16

## 2019-02-06 RX ADMIN — ATORVASTATIN CALCIUM 40 MILLIGRAM(S): 80 TABLET, FILM COATED ORAL at 23:16

## 2019-02-06 RX ADMIN — PRIMIDONE 250 MILLIGRAM(S): 250 TABLET ORAL at 18:38

## 2019-02-06 RX ADMIN — Medication 1 SPRAY(S): at 18:52

## 2019-02-06 RX ADMIN — DULOXETINE HYDROCHLORIDE 60 MILLIGRAM(S): 30 CAPSULE, DELAYED RELEASE ORAL at 18:34

## 2019-02-06 RX ADMIN — GABAPENTIN 1200 MILLIGRAM(S): 400 CAPSULE ORAL at 05:21

## 2019-02-06 RX ADMIN — Medication 300 MILLIGRAM(S): at 18:33

## 2019-02-06 RX ADMIN — LOSARTAN POTASSIUM 100 MILLIGRAM(S): 100 TABLET, FILM COATED ORAL at 18:37

## 2019-02-06 RX ADMIN — PRIMIDONE 250 MILLIGRAM(S): 250 TABLET ORAL at 23:16

## 2019-02-06 RX ADMIN — GABAPENTIN 1200 MILLIGRAM(S): 400 CAPSULE ORAL at 18:34

## 2019-02-06 NOTE — PROGRESS NOTE ADULT - ASSESSMENT
74M with PMH of HTN, HLD, JOSSY who presents from [facility] for evaluation of chest pain and abnormal EKG. Endorses URI symptoms, found to be coronavirus positive. Chest pain was in setting of coughing and he is currently CP free. EKG with bigeminy. Cardiac biomarkers negative. This is not ACS. Recommend admission and further work-up of bigeminy.    #Persistent Bigeminy/trigeminy  - Check TSH, lipid panel, Hgb A1c  - Replete electrolytes PRN  - Check TTE  - Will plan on [facility] today, 2/6, to rule out ischemia given abnormal NST      JOVANNY Ascencio  Cardiology Fellow   [id number] 74M with PMH of HTN, HLD, JOSSY who presents from [facility] for evaluation of chest pain and abnormal EKG. Endorses URI symptoms, found to be coronavirus positive. Chest pain was in setting of coughing and he is currently CP free. EKG with bigeminy. Cardiac biomarkers negative. This is not ACS. Recommend admission and further work-up of bigeminy.    #Persistent Bigeminy/trigeminy w/ effective bradycardia  - Check TSH, lipid panel, Hgb A1c  - Replete electrolytes PRN  - Check TTE  - Will plan on [facility] today, 2/6, to rule out ischemia given abnormal NST  - Attemop to ambulate pt to assess for suppression of ectopy      JOVANNY Ascencio  Cardiology Fellow   [id number]

## 2019-02-06 NOTE — PROGRESS NOTE ADULT - PROBLEM SELECTOR PLAN 8
DVT: enoxaparin  DISPO: pending TTE, cardiology recs  w/ police officers at bedside, pt is a prisoner DVT: enoxaparin  DISPO: cardiology recs  w/ police officers at bedside, pt is a prisoner

## 2019-02-06 NOTE — PROGRESS NOTE ADULT - PROBLEM SELECTOR PLAN 3
Patient w/ viral symptoms w/ RVP +, no signs of pna  - Symptoms management - start flonase bid, guiafenesin TID atc,

## 2019-02-06 NOTE — PROGRESS NOTE ADULT - PROBLEM SELECTOR PLAN 7
Patient w/ hx of nerve stimulator on duloxetine and gabapentin for neuropathic pain  - continue home duloxetine and gabapentin

## 2019-02-06 NOTE — PROGRESS NOTE ADULT - SUBJECTIVE AND OBJECTIVE BOX
Patient is a 74y old  Male who presents with a chief complaint of Angiography (2019 12:00)        SUBJECTIVE / OVERNIGHT EVENTS:  overnight no acute events.  seen after [facility], received 1 stent to RCA.  c/o congestion.  denies cp, sob, abd pain, n/v/f/chills.    CAPILLARY BLOOD GLUCOSE    I&O's Summary    2019 07:01  -  2019 16:21  --------------------------------------------------------  IN: 120 mL / OUT: 0 mL / NET: 120 mL    Vital Signs Last 24 Hrs  T(C): 36.6 (2019 05:22), Max: 36.9 (2019 22:57)  T(F): 97.9 (2019 05:22), Max: 98.5 (2019 22:57)  HR: 60 (2019 05:22) (60 - 85)  BP: 169/76 (2019 05:22) (132/67 - 169/76)  BP(mean): --  RR: 16 (2019 05:22) (15 - 18)  SpO2: 96% (2019 05:22) (96% - 100%)    PHYSICAL EXAM:  GENERAL:  Well appearing, M, nasal congestion, in NAD  HEAD:  NCAT  EYES: PERRLA, conjunctiva clear  NECK: Supple, No JVD  CHEST/LUNG: CTA B/L. No w/r/r.  HEART: Reg rate. Normal S1, S2. No m/r/g.   ABDOMEN: SNT, obese  EXTREMITIES:  2+ Peripheral Pulses, No clubbing, cyanosis, edema.  PSYCH: appropriate affect      LABS:                        14.8   9.01  )-----------( 209      ( 2019 10:49 )             45.5     02-06    142  |  102  |  15  ----------------------------<  92  3.8   |  26  |  0.96    Ca    8.9      2019 09:15  Phos  3.5     02-06  Mg     1.9     02-06    TPro  6.6  /  Alb  3.6  /  TBili  0.2  /  DBili  x   /  AST  15  /  ALT  17  /  AlkPhos  139<H>  02-06    PT/INR - ( 2019 10:49 )   PT: 11.7 sec;   INR: 1.03 ratio         PTT - ( 2019 10:49 )  PTT:29.8 sec      Urinalysis Basic - ( 2019 17:57 )    Color: Light Yellow / Appearance: Clear / S.010 / pH: x  Gluc: x / Ketone: Negative  / Bili: Negative / Urobili: Negative   Blood: x / Protein: Trace / Nitrite: Negative   Leuk Esterase: Negative / RBC: x / WBC x   Sq Epi: x / Non Sq Epi: x / Bacteria: x    RADIOLOGY & ADDITIONAL TESTS:  Care Discussed with Consultants/Other Providers: Floor NP    MEDICATIONS  (STANDING):  allopurinol 300 milliGRAM(s) Oral daily  atorvastatin 40 milliGRAM(s) Oral at bedtime  DULoxetine 60 milliGRAM(s) Oral daily  enoxaparin Injectable 40 milliGRAM(s) SubCutaneous every 24 hours  fluticasone propionate 50 MICROgram(s)/spray Nasal Spray 1 Spray(s) Both Nostrils two times a day  gabapentin 1200 milliGRAM(s) Oral three times a day  guaiFENesin    Syrup 200 milliGRAM(s) Oral every 8 hours  losartan 100 milliGRAM(s) Oral daily  primidone 250 milliGRAM(s) Oral three times a day  tamsulosin 0.4 milliGRAM(s) Oral at bedtime    MEDICATIONS  (PRN):  acetaminophen   Tablet .. 650 milliGRAM(s) Oral every 6 hours PRN Temp greater or equal to 38C (100.4F)      Home Medications:  allopurinol 300 mg oral tablet: 1 tab(s) orally once a day (2019 20:01)  antibiotic:  (2019 20:01)  Caltrate 600 mg oral tablet: 1 tab(s) orally once a day (2019 20:01)  Cymbalta 60 mg oral delayed release capsule: 1 cap(s) orally once a day (2019 20:01)  Flomax 0.4 mg oral capsule: 1 cap(s) orally once a day (2019 20:01)  hydrochlorothiazide-losartan 12.5 mg-100 mg oral tablet: 1 tab(s) orally once a day (2019 20:)  Lipitor 40 mg oral tablet: 1 tab(s) orally once a day (2019 20:)  Multiple Vitamins oral tablet:  (2019 20:)  Neurontin 600 mg oral tablet: 2 tab(s) orally 3 times a day (2019 20:01)  primidone 250 mg oral tablet: 1 tab(s) orally 3 times a day (2019 20:01)

## 2019-02-06 NOTE — PROGRESS NOTE ADULT - PROBLEM SELECTOR PLAN 1
New onset bigeminy with atypical symptoms w/ mild ischemic changes on nuclear stress test concerning for CAD  Now s/p [facility] 2/6 mild LAD 30' D1 60; ostial LCx 40; mid RCA 80 atherectomy/CHAITANYA  - ASA/plavix 1 year  - c/w statin  - holding BB currently given bradycardia on admission - will f/u cards  - c/w home ARB  - f/u official cath report  - F/U TTE New onset bigeminy with atypical symptoms w/ mild ischemic changes on nuclear stress test concerning for CAD  Now s/p [facility] 2/6 mild LAD 30' D1 60; ostial LCx 40; mid RCA 80 atherectomy/CHAITANYA  - ASA/plavix 1 year  - c/w statin  - holding BB currently given bradycardia on admission - will f/u cards  - c/w home ARB  - f/u official cath report  - pt w/ recent TTE EF 50% - will f/u official report

## 2019-02-06 NOTE — PROGRESS NOTE ADULT - ASSESSMENT
75 Y/O M with PMH of spinal stenosis s/p neuromodulators cervical and lumbar, hx drug abuse, gout, HTN, HLD, JOSSY p/w URI symptoms found to have coronavirus adm w/ bigeminy/bradycardia with abnormal stress test now s/p [facility] 2/6 w/ CHAITANYA to RCA.

## 2019-02-06 NOTE — PROGRESS NOTE ADULT - SUBJECTIVE AND OBJECTIVE BOX
Patient seen and examined at bedside.    Overnight Events: Denies CP, SOB. Continues to have congestion and rhinorrhea       REVIEW OF SYSTEMS:  Constitutional:     No fevers, chills, weight loss, weight gain  HEENT:                  see above  CV:                         No chest pain, palpitations, orthopnea, PND  Resp:                     No cough, SOB, dyspnea, wheezing, sputum  GI:                          No nausea, vomiting, abdominal pain, diarrhea, constipation  :                        No dysuria, nocturia, hematuria, increased urinary frequency  Musculoskeletal: No back pain, myalgias, arthralgias   Skin:                       No rash, pruritus, ecchymoses  Neurological:        No headache, dizziness, syncope, weakness, numbness  Psychiatric:           No anxiety, depression   Endocrine:            No hot/cold intolerance, polydipsia  Heme/Lymph:      No bleeding, easy bruising  Allergic/Immune: No itchy eyes, rhinorrhea, hives angioedema      Current Meds:  acetaminophen   Tablet .. 650 milliGRAM(s) Oral every 6 hours PRN  allopurinol 300 milliGRAM(s) Oral daily  atorvastatin 40 milliGRAM(s) Oral at bedtime  DULoxetine 60 milliGRAM(s) Oral daily  enoxaparin Injectable 40 milliGRAM(s) SubCutaneous every 24 hours  gabapentin 1200 milliGRAM(s) Oral three times a day  guaiFENesin    Syrup 200 milliGRAM(s) Oral every 6 hours PRN  primidone 250 milliGRAM(s) Oral three times a day  tamsulosin 0.4 milliGRAM(s) Oral at bedtime      PAST MEDICAL & SURGICAL HISTORY:  Spondylolisthesis  Familial tremor  Obesity (BMI 30-39.9)  Drug dependence: Chronic pain - opioid dependence, has completed suboxone therapy  - no opioids for last 5 months  Ventral hernia  Cervical arthritis  HTN (hypertension)  Testosterone deficiency  BPH (benign prostatic hypertrophy)  Hyperlipidemia  Gout  JOSSY (obstructive sleep apnea): noncompliant with cpap  Spinal stenosis of lumbar region  Deviated septum  Nasal polyps  Malignant melanoma of abdomen: 2014 - s/p excision  History of lumbar laminectomy: 2012 L4-L5  H/O Spinal Surgery: 2007 Placement of battery pack for neurostimulator  History of laminectomy: 2007 Cervical laminectomy for neurostimulator  Hx of LASIK: 6 years ago  Cervical disc disorder: anterior cervical diskectomy C6-C7 2001 2005 posterior cervical cecompression and foraminectomy fusion and plating  8/2005 anterior cervical diskectomy and removal of plate and replated with fustion   2007 cervical laminectomy  Neuropathy of hand: s/p insertion of neurostimulator in 2007 secondary to neuropathy in the RIGHT wrist/forearm      Vitals:  T(F): 97.9 (02-06), Max: 98.5 (02-05)  HR: 60 (02-06) (43 - 85)  BP: 169/76 (02-06) (126/63 - 183/90)  RR: 16 (02-06)  SpO2: 96% (02-06)  I&O's Summary      Physical Exam:  Appearance: no acute distress  HEENT:   Normal oral mucosa, PERRL, EOMI	  Lymphatic: No lymphadenopathy  Cardiovascular: Regularly irregular, Normal S1 S2, No JVD, No murmurs, No edema  Respiratory: Lungs clear to auscultation	  Psychiatry: A & O x 3, Mood & affect appropriate  Gastrointestinal:  Soft, Non-tender, + BS	  Skin: No rashes, No ecchymoses, No cyanosis	  Neurologic: Non-focal  Extremities: Normal range of motion, No clubbing, cyanosis or edema  Vascular: Peripheral pulses palpable 2+ bilaterally                          14.8   9.01  )-----------( 209      ( 06 Feb 2019 10:49 )             45.5     02-06    142  |  102  |  15  ----------------------------<  92  3.8   |  26  |  0.96    Ca    8.9      06 Feb 2019 09:15  Phos  3.5     02-06  Mg     1.9     02-06    TPro  6.6  /  Alb  3.6  /  TBili  0.2  /  DBili  x   /  AST  15  /  ALT  17  /  AlkPhos  139<H>  02-06    PT/INR - ( 06 Feb 2019 10:49 )   PT: 11.7 sec;   INR: 1.03 ratio         PTT - ( 06 Feb 2019 10:49 )  PTT:29.8 sec      Serum Pro-Brain Natriuretic Peptide: 391 pg/mL (02-05 @ 13:50)        Interpretation of Telemetry: bigeminy, trigeminy

## 2019-02-06 NOTE — PROGRESS NOTE ADULT - PROBLEM SELECTOR PLAN 2
may have been induced by ischemic heart disease now s/p cath as above  - monitor on tele  - follow up cardiology recommendations

## 2019-02-07 ENCOUNTER — TRANSCRIPTION ENCOUNTER (OUTPATIENT)
Age: 74
End: 2019-02-07

## 2019-02-07 DIAGNOSIS — R26.81 UNSTEADINESS ON FEET: ICD-10-CM

## 2019-02-07 LAB
ANION GAP SERPL CALC-SCNC: 14 MMOL/L — SIGNIFICANT CHANGE UP (ref 5–17)
BASOPHILS # BLD AUTO: 0.1 K/UL — SIGNIFICANT CHANGE UP (ref 0–0.2)
BASOPHILS NFR BLD AUTO: 0.4 % — SIGNIFICANT CHANGE UP (ref 0–2)
BUN SERPL-MCNC: 17 MG/DL — SIGNIFICANT CHANGE UP (ref 7–23)
CALCIUM SERPL-MCNC: 9.3 MG/DL — SIGNIFICANT CHANGE UP (ref 8.4–10.5)
CHLORIDE SERPL-SCNC: 101 MMOL/L — SIGNIFICANT CHANGE UP (ref 96–108)
CO2 SERPL-SCNC: 25 MMOL/L — SIGNIFICANT CHANGE UP (ref 22–31)
CREAT SERPL-MCNC: 1.03 MG/DL — SIGNIFICANT CHANGE UP (ref 0.5–1.3)
EOSINOPHIL # BLD AUTO: 0.5 K/UL — SIGNIFICANT CHANGE UP (ref 0–0.5)
EOSINOPHIL NFR BLD AUTO: 4 % — SIGNIFICANT CHANGE UP (ref 0–6)
GLUCOSE SERPL-MCNC: 111 MG/DL — HIGH (ref 70–99)
HCT VFR BLD CALC: 45.6 % — SIGNIFICANT CHANGE UP (ref 39–50)
HGB BLD-MCNC: 15.9 G/DL — SIGNIFICANT CHANGE UP (ref 13–17)
LYMPHOCYTES # BLD AUTO: 18.2 % — SIGNIFICANT CHANGE UP (ref 13–44)
LYMPHOCYTES # BLD AUTO: 2.2 K/UL — SIGNIFICANT CHANGE UP (ref 1–3.3)
MCHC RBC-ENTMCNC: 33.1 PG — SIGNIFICANT CHANGE UP (ref 27–34)
MCHC RBC-ENTMCNC: 34.8 GM/DL — SIGNIFICANT CHANGE UP (ref 32–36)
MCV RBC AUTO: 95 FL — SIGNIFICANT CHANGE UP (ref 80–100)
MONOCYTES # BLD AUTO: 1 K/UL — HIGH (ref 0–0.9)
MONOCYTES NFR BLD AUTO: 8.2 % — SIGNIFICANT CHANGE UP (ref 2–14)
NEUTROPHILS # BLD AUTO: 8.1 K/UL — HIGH (ref 1.8–7.4)
NEUTROPHILS NFR BLD AUTO: 69.1 % — SIGNIFICANT CHANGE UP (ref 43–77)
PLATELET # BLD AUTO: 213 K/UL — SIGNIFICANT CHANGE UP (ref 150–400)
POTASSIUM SERPL-MCNC: 4.2 MMOL/L — SIGNIFICANT CHANGE UP (ref 3.5–5.3)
POTASSIUM SERPL-SCNC: 4.2 MMOL/L — SIGNIFICANT CHANGE UP (ref 3.5–5.3)
RBC # BLD: 4.8 M/UL — SIGNIFICANT CHANGE UP (ref 4.2–5.8)
RBC # FLD: 13.6 % — SIGNIFICANT CHANGE UP (ref 10.3–14.5)
SODIUM SERPL-SCNC: 140 MMOL/L — SIGNIFICANT CHANGE UP (ref 135–145)
WBC # BLD: 11.8 K/UL — HIGH (ref 3.8–10.5)
WBC # FLD AUTO: 11.8 K/UL — HIGH (ref 3.8–10.5)

## 2019-02-07 PROCEDURE — 99232 SBSQ HOSP IP/OBS MODERATE 35: CPT

## 2019-02-07 PROCEDURE — 99232 SBSQ HOSP IP/OBS MODERATE 35: CPT | Mod: GC

## 2019-02-07 PROCEDURE — 93306 TTE W/DOPPLER COMPLETE: CPT | Mod: 26

## 2019-02-07 RX ORDER — METOPROLOL TARTRATE 50 MG
25 TABLET ORAL ONCE
Qty: 0 | Refills: 0 | Status: COMPLETED | OUTPATIENT
Start: 2019-02-07 | End: 2019-02-07

## 2019-02-07 RX ORDER — ASPIRIN/CALCIUM CARB/MAGNESIUM 324 MG
1 TABLET ORAL
Qty: 30 | Refills: 0
Start: 2019-02-07 | End: 2019-03-08

## 2019-02-07 RX ORDER — LOSARTAN POTASSIUM 100 MG/1
1 TABLET, FILM COATED ORAL
Qty: 30 | Refills: 0
Start: 2019-02-07 | End: 2019-03-08

## 2019-02-07 RX ORDER — CLOPIDOGREL BISULFATE 75 MG/1
1 TABLET, FILM COATED ORAL
Qty: 30 | Refills: 0
Start: 2019-02-07 | End: 2019-03-08

## 2019-02-07 RX ORDER — METOPROLOL TARTRATE 50 MG
25 TABLET ORAL
Qty: 0 | Refills: 0 | Status: DISCONTINUED | OUTPATIENT
Start: 2019-02-07 | End: 2019-02-08

## 2019-02-07 RX ORDER — METOPROLOL TARTRATE 50 MG
1 TABLET ORAL
Qty: 60 | Refills: 0
Start: 2019-02-07 | End: 2019-03-08

## 2019-02-07 RX ORDER — FLUTICASONE PROPIONATE 50 MCG
1 SPRAY, SUSPENSION NASAL
Qty: 0 | Refills: 0 | DISCHARGE
Start: 2019-02-07

## 2019-02-07 RX ORDER — ACETAMINOPHEN 500 MG
650 TABLET ORAL ONCE
Qty: 0 | Refills: 0 | Status: COMPLETED | OUTPATIENT
Start: 2019-02-07 | End: 2019-02-07

## 2019-02-07 RX ADMIN — Medication 650 MILLIGRAM(S): at 22:41

## 2019-02-07 RX ADMIN — DULOXETINE HYDROCHLORIDE 60 MILLIGRAM(S): 30 CAPSULE, DELAYED RELEASE ORAL at 11:47

## 2019-02-07 RX ADMIN — Medication 1 SPRAY(S): at 17:34

## 2019-02-07 RX ADMIN — Medication 200 MILLIGRAM(S): at 13:21

## 2019-02-07 RX ADMIN — Medication 200 MILLIGRAM(S): at 05:36

## 2019-02-07 RX ADMIN — Medication 81 MILLIGRAM(S): at 11:47

## 2019-02-07 RX ADMIN — Medication 200 MILLIGRAM(S): at 21:42

## 2019-02-07 RX ADMIN — GABAPENTIN 1200 MILLIGRAM(S): 400 CAPSULE ORAL at 21:42

## 2019-02-07 RX ADMIN — GABAPENTIN 1200 MILLIGRAM(S): 400 CAPSULE ORAL at 05:36

## 2019-02-07 RX ADMIN — Medication 650 MILLIGRAM(S): at 21:41

## 2019-02-07 RX ADMIN — ENOXAPARIN SODIUM 40 MILLIGRAM(S): 100 INJECTION SUBCUTANEOUS at 21:42

## 2019-02-07 RX ADMIN — LOSARTAN POTASSIUM 100 MILLIGRAM(S): 100 TABLET, FILM COATED ORAL at 05:36

## 2019-02-07 RX ADMIN — Medication 300 MILLIGRAM(S): at 11:47

## 2019-02-07 RX ADMIN — CLOPIDOGREL BISULFATE 75 MILLIGRAM(S): 75 TABLET, FILM COATED ORAL at 11:47

## 2019-02-07 RX ADMIN — Medication 25 MILLIGRAM(S): at 17:34

## 2019-02-07 RX ADMIN — Medication 1 SPRAY(S): at 05:36

## 2019-02-07 RX ADMIN — GABAPENTIN 1200 MILLIGRAM(S): 400 CAPSULE ORAL at 13:21

## 2019-02-07 RX ADMIN — Medication 25 MILLIGRAM(S): at 13:06

## 2019-02-07 RX ADMIN — PRIMIDONE 250 MILLIGRAM(S): 250 TABLET ORAL at 13:21

## 2019-02-07 RX ADMIN — ATORVASTATIN CALCIUM 40 MILLIGRAM(S): 80 TABLET, FILM COATED ORAL at 21:42

## 2019-02-07 RX ADMIN — PRIMIDONE 250 MILLIGRAM(S): 250 TABLET ORAL at 05:36

## 2019-02-07 RX ADMIN — PRIMIDONE 250 MILLIGRAM(S): 250 TABLET ORAL at 21:42

## 2019-02-07 RX ADMIN — TAMSULOSIN HYDROCHLORIDE 0.4 MILLIGRAM(S): 0.4 CAPSULE ORAL at 21:42

## 2019-02-07 NOTE — PROGRESS NOTE ADULT - SUBJECTIVE AND OBJECTIVE BOX
Patient is a 74y old  Male who presents with a chief complaint of Angiography (2019 12:02)        SUBJECTIVE / OVERNIGHT EVENTS:  overnight no acute events.   Denies cp, sob, abd pain, n/v/f/chills.  felt dizzy this am while ambulating with the RN and with unstable gait.     CAPILLARY BLOOD GLUCOSE    I&O's Summary    2019 07:01  -  2019 07:00  --------------------------------------------------------  IN: 360 mL / OUT: 0 mL / NET: 360 mL    2019 07:01  -  2019 16:52  --------------------------------------------------------  IN: 480 mL / OUT: 350 mL / NET: 130 mL    Vital Signs Last 24 Hrs  T(C): 36.5 (2019 13:30), Max: 36.7 (2019 20:35)  T(F): 97.7 (2019 13:30), Max: 98.1 (2019 20:35)  HR: 82 (2019 13:30) (79 - 93)  BP: 125/69 (2019 13:30) (123/85 - 154/81)  BP(mean): --  RR: 16 (2019 13:30) (16 - 18)  SpO2: 95% (2019 13:30) (93% - 95%)    PHYSICAL EXAM:  GENERAL:  Well appearing, M,  in NAD  HEAD:  NCAT  EYES: PERRLA, conjunctiva clear  NECK: Supple, No JVD  CHEST/LUNG: CTA B/L. No w/r/r.  HEART: Reg rate. Normal S1, S2. No m/r/g.   ABDOMEN: SNT, obese  EXTREMITIES:  2+ Peripheral Pulses, No clubbing, cyanosis, edema.  PSYCH: appropriate affect    LABS:                        15.9   11.8  )-----------( 213      ( 2019 08:08 )             45.6     02-07    140  |  101  |  17  ----------------------------<  111<H>  4.2   |  25  |  1.03    Ca    9.3      2019 08:08  Phos  3.5     02-06  Mg     1.9     02-06    TPro  6.6  /  Alb  3.6  /  TBili  0.2  /  DBili  x   /  AST  15  /  ALT  17  /  AlkPhos  139<H>  02-06    PT/INR - ( 2019 10:49 )   PT: 11.7 sec;   INR: 1.03 ratio         PTT - ( 2019 10:49 )  PTT:29.8 sec      Urinalysis Basic - ( 2019 17:57 )    Color: Light Yellow / Appearance: Clear / S.010 / pH: x  Gluc: x / Ketone: Negative  / Bili: Negative / Urobili: Negative   Blood: x / Protein: Trace / Nitrite: Negative   Leuk Esterase: Negative / RBC: x / WBC x   Sq Epi: x / Non Sq Epi: x / Bacteria: x      RADIOLOGY & ADDITIONAL TESTS:  Telemetry reviewed:  sinus rhythm w/ frequent PVCs  Consultant(s) Notes Reviewed:  cards  Care Discussed with Consultants/Other Providers: Floor NP, cardiology fellow    MEDICATIONS  (STANDING):  allopurinol 300 milliGRAM(s) Oral daily  aspirin enteric coated 81 milliGRAM(s) Oral daily  atorvastatin 40 milliGRAM(s) Oral at bedtime  clopidogrel Tablet 75 milliGRAM(s) Oral daily  DULoxetine 60 milliGRAM(s) Oral daily  enoxaparin Injectable 40 milliGRAM(s) SubCutaneous every 24 hours  fluticasone propionate 50 MICROgram(s)/spray Nasal Spray 1 Spray(s) Both Nostrils two times a day  gabapentin 1200 milliGRAM(s) Oral three times a day  guaiFENesin    Syrup 200 milliGRAM(s) Oral every 8 hours  losartan 100 milliGRAM(s) Oral daily  metoprolol tartrate 25 milliGRAM(s) Oral two times a day  primidone 250 milliGRAM(s) Oral three times a day  tamsulosin 0.4 milliGRAM(s) Oral at bedtime    MEDICATIONS  (PRN):  acetaminophen   Tablet .. 650 milliGRAM(s) Oral every 6 hours PRN Temp greater or equal to 38C (100.4F)

## 2019-02-07 NOTE — PROGRESS NOTE ADULT - ASSESSMENT
75 y/o M PMH of spinal stenosis s/p neuromodulators cervical and lumbar, hx drug abuse, gout, HTN, HLD, JOSSY p/w URI symptoms found to have coronavirus adm w/ bigeminy/bradycardia with abnormal stress test now s/p [facility] 2/6 w/ CHAITANYA to RCA.

## 2019-02-07 NOTE — PROGRESS NOTE ADULT - PROBLEM SELECTOR PLAN 5
controlled  - c/w losartan 100 mg qd as needed. Hold hctz s/p cath.  - c/w metop as above  - monitor BP

## 2019-02-07 NOTE — PROGRESS NOTE ADULT - PROBLEM SELECTOR PLAN 8
DVT: enoxaparin  DISPO: d/c planning pending ambulation status. if unable to ambulate, PT eval tomorrow AM  Pt was incarcerated, but now finished term and no longer under arrest as of today

## 2019-02-07 NOTE — DISCHARGE NOTE ADULT - MEDICATION SUMMARY - MEDICATIONS TO STOP TAKING
I will STOP taking the medications listed below when I get home from the hospital:    hydrochlorothiazide-losartan 12.5 mg-100 mg oral tablet  -- 1 tab(s) by mouth once a day

## 2019-02-07 NOTE — DISCHARGE NOTE ADULT - CONDITIONS AT DISCHARGE
-Follow up with Cardiologist Venita Rodriguez [phone] ([address] [place] [phone])  -Follow up with outpatient MD John Barksdale [address] [address] [phone]

## 2019-02-07 NOTE — DISCHARGE NOTE ADULT - CARE PROVIDER_API CALL
Wili Mccray (DO)  Emergency Medicine  [address], [address]  [place] [address]  Phone: [phone]  Fax: [phone]  Follow Up Time: Venita Rodriguez ()  Cardiology; Internal Medicine  [address] [place] [address]  Phone: [phone]  Fax: [phone]  Follow Up Time:

## 2019-02-07 NOTE — DISCHARGE NOTE ADULT - PATIENT PORTAL LINK FT
You can access the [facility], offered by [facility], by registering with the following website: [email or web address]

## 2019-02-07 NOTE — DISCHARGE NOTE ADULT - PLAN OF CARE
stable s/p PCI   c/w DAPT tolerating metoprolol well.   continue close follow up with cardiology Follow up with your medical doctor to establish long term blood pressure treatment goals. c/w meds as prescribed you had a catheterization with placement of stent  you should continue your medications as prescribed and follow up with the cardiologist

## 2019-02-07 NOTE — DISCHARGE NOTE ADULT - HOSPITAL COURSE
74M with PMH of HTN, HLD, JOSSY who presents from [facility] for evaluation of chest pain and abnormal EKG. Endorses URI symptoms, found to be coronavirus positive. Chest pain was in setting of coughing and he is currently CP free. EKG with bigeminy. Cardiac biomarkers negative. LHC on 2/6 with 80% stenosis of RCA.     #Persistent Bigeminy/trigeminy w/ effective bradycardia  - Check TTE - discussed with echo lab and it should be performed today  - S/p LHC with CHAITANYA to RCA on 2/6; remains in trigeminy despite revascularization   - Attempt to ambulate pt in hallway to assess for suppression of ectopy  - Start metoprolol tartrate 25 mg PO BID  /p LHC with CHAITANYA to RCA on 2/6  - Continue ASA, Plavix, statin  - Patient should f/u with a cardiologist in 1-2 weeks after discharge. He would like to follow with Dr. Venita Rodriguez 73 y/o M PMH of spinal stenosis s/p neuromodulators cervical and lumbar, hx drug abuse, gout, HTN, HLD, JOSSY p/w URI symptoms found to have coronavirus adm w/ bigeminy/bradycardia with abnormal stress test. Diagnosed with unstable angina (not NSTEMI), which pt now s/p LHC 2/6 w/ CHAITANYA to RCA. Continued on ASA/plavix 1 year, statin, arb. Started on metoprolol for frequent PVCs/bigeminy and trigeminy which he tolerated well and will f/u with cardiology for titration of this medicine. TTE this adm grossly unremarkable - normal EF. Noted to have gait instability, chronic likely spinal stenosis, which pt was seen by physical therapy and rec for home PT with walker. B12 level pending, which pt will f/u outpt for result.  Bigeminal rhythm was likely in setting of unstable angina. Pt was treated with symptom support for coronavirus, with mild leukocytosis 2/2 viral infection. His HCTZ was d/c'ed given dizziness on ambulation, and goal not to decr bp too much while started on metoprolol, which he tolerated well.     Patient should f/u with a cardiologist in 1 weeks after discharge. He would like to follow with Dr. Venita Rodriguez

## 2019-02-07 NOTE — PROGRESS NOTE ADULT - PROBLEM SELECTOR PLAN 3
may have been induced by ischemic heart disease now s/p cath as above  - monitor on tele  - started on bb as above

## 2019-02-07 NOTE — DISCHARGE NOTE ADULT - ADDITIONAL INSTRUCTIONS
dash diet dash diet  see Dr Rodriguez in 1 week  Please have your b12 level followed when you see Dr Rodriguez (it was drawn here)

## 2019-02-07 NOTE — DISCHARGE NOTE ADULT - MEDICATION SUMMARY - MEDICATIONS TO TAKE
I will START or STAY ON the medications listed below when I get home from the hospital:    aspirin 81 mg oral delayed release tablet  -- 1 tab(s) by mouth once a day  -- Indication: For Cad     losartan 100 mg oral tablet  -- 1 tab(s) by mouth once a day  -- Indication: For HTN (hypertension)    Flomax 0.4 mg oral capsule  -- 1 cap(s) by mouth once a day  -- Indication: For Bph     primidone 250 mg oral tablet  -- 1 tab(s) by mouth 3 times a day  -- Indication: For Seizure     Neurontin 600 mg oral tablet  -- 2 tab(s) by mouth 3 times a day  -- Indication: For Neuropathy     Cymbalta 60 mg oral delayed release capsule  -- 1 cap(s) by mouth once a day  -- Indication: For depression    allopurinol 300 mg oral tablet  -- 1 tab(s) by mouth once a day  -- Indication: For Gout     Lipitor 40 mg oral tablet  -- 1 tab(s) by mouth once a day  -- Indication: For Hyperlipidemia     clopidogrel 75 mg oral tablet  -- 1 tab(s) by mouth once a day  -- Indication: For NSTEMI (non-ST elevated myocardial infarction)    metoprolol tartrate 25 mg oral tablet  -- 1 tab(s) by mouth 2 times a day  -- Indication: For NSTEMI (non-ST elevated myocardial infarction)    fluticasone 50 mcg/inh nasal spray  -- 1 spray(s) into nose 2 times a day  -- Indication: For Nasal spray     Multiple Vitamins oral tablet  -- Indication: For vitamin I will START or STAY ON the medications listed below when I get home from the hospital:    aspirin 81 mg oral delayed release tablet  -- 1 tab(s) by mouth once a day  -- Indication: For Cad     losartan 100 mg oral tablet  -- 1 tab(s) by mouth once a day  -- Indication: For HTN (hypertension)    Flomax 0.4 mg oral capsule  -- 1 cap(s) by mouth once a day  -- Indication: For Bph     primidone 250 mg oral tablet  -- 1 tab(s) by mouth 3 times a day  -- Indication: For Seizure     Neurontin 600 mg oral tablet  -- 2 tab(s) by mouth 3 times a day  -- Indication: For Neuropathy     Cymbalta 60 mg oral delayed release capsule  -- 1 cap(s) by mouth once a day  -- Indication: For depression    allopurinol 300 mg oral tablet  -- 1 tab(s) by mouth once a day  -- Indication: For Gout     Lipitor 40 mg oral tablet  -- 1 tab(s) by mouth once a day  -- Indication: For Hyperlipidemia     clopidogrel 75 mg oral tablet  -- 1 tab(s) by mouth once a day  -- Indication: For Abnormal stress test    metoprolol tartrate 25 mg oral tablet  -- 1 tab(s) by mouth 2 times a day  -- Indication: For Abnormal stress test    fluticasone 50 mcg/inh nasal spray  -- 1 spray(s) into nose 2 times a day  -- Indication: For Nasal spray     Multiple Vitamins oral tablet  -- Indication: For vitamin

## 2019-02-07 NOTE — PROGRESS NOTE ADULT - ASSESSMENT
74M with PMH of HTN, HLD, JOSSY who presents from [facility] for evaluation of chest pain and abnormal EKG. Endorses URI symptoms, found to be coronavirus positive. Chest pain was in setting of coughing and he is currently CP free. EKG with bigeminy. Cardiac biomarkers negative. LHC on 2/6 with 80% stenosis of RCA.     #Persistent Bigeminy/trigeminy w/ effective bradycardia  - Check TTE - discussed with echo lab and it should be performed today  - S/p LHC with CHAITANYA to RCA on 2/6; remains in trigeminy despite revascularization   - Attempt to ambulate pt in hallway to assess for suppression of ectopy  - Start metoprolol tartrate 25 mg PO BID    #CAD  - S/p LHC with CHAITANYA to RCA on 2/6  - Continue ASA, Plavix, statin  - Patient should f/u with a cardiologist in 1-2 weeks after discharge. He would like to follow with Dr. Venita Ascencio  Cardiology Fellow   [id number]

## 2019-02-07 NOTE — PROGRESS NOTE ADULT - PROBLEM SELECTOR PLAN 2
suspect chronic issue due to hx of spinal stenosis, chronic pain w/ neurostimulator. No recent falls  - check b12  - PT consult

## 2019-02-07 NOTE — PROGRESS NOTE ADULT - PROBLEM SELECTOR PLAN 1
New onset bigeminy with atypical symptoms w/ mild ischemic changes on nuclear stress test concerning for CAD  Now s/p C 2/6 mild LAD 30' D1 60; ostial LCx 40; mid RCA 80 atherectomy/CHAITANYA  - ASA/plavix 1 year  - c/w statin  - D/W cardiology - started on metop 25 bid for frequent PVCs, ok as no longer w bradycardia on review of 24 hour tele  - c/w home ARB  - TTE this adm grossly unremarkable - normal EF

## 2019-02-07 NOTE — DISCHARGE NOTE ADULT - CARE PLAN
Principal Discharge DX:	NSTEMI (non-ST elevated myocardial infarction)  Goal:	stable  Assessment and plan of treatment:	s/p PCI   c/w DAPT  Secondary Diagnosis:	Bigeminal rhythm  Goal:	stable  Assessment and plan of treatment:	tolerating metoprolol well.   continue close follow up with cardiology  Secondary Diagnosis:	HTN (hypertension)  Assessment and plan of treatment:	Follow up with your medical doctor to establish long term blood pressure treatment goals.  Secondary Diagnosis:	Familial tremor  Assessment and plan of treatment:	c/w meds as prescribed Principal Discharge DX:	Unstable angina  Goal:	stable  Assessment and plan of treatment:	you had a catheterization with placement of stent  you should continue your medications as prescribed and follow up with the cardiologist  Secondary Diagnosis:	Bigeminal rhythm  Goal:	stable  Assessment and plan of treatment:	tolerating metoprolol well.   continue close follow up with cardiology  Secondary Diagnosis:	HTN (hypertension)  Assessment and plan of treatment:	Follow up with your medical doctor to establish long term blood pressure treatment goals.  Secondary Diagnosis:	Familial tremor  Assessment and plan of treatment:	c/w meds as prescribed

## 2019-02-07 NOTE — PROGRESS NOTE ADULT - SUBJECTIVE AND OBJECTIVE BOX
Patient seen and examined at bedside.    Overnight Events: S/p CHAITANYA to RCA yesterday. Remains predominantly in trigeminy. Denies CP, SOB, LE edema.       REVIEW OF SYSTEMS:  Constitutional:     No fevers, chills, weight loss, weight gain  HEENT:                  No dry eyes, nasal congestion, postnasal drip  CV:                         No chest pain, palpitations, orthopnea, PND  Resp:                     No cough, SOB, dyspnea, wheezing, sputum  GI:                          No nausea, vomiting, abdominal pain, diarrhea, constipation  :                        No dysuria, nocturia, hematuria, increased urinary frequency  Musculoskeletal: No back pain, myalgias, arthralgias   Skin:                       No rash, pruritus, ecchymoses  Neurological:        No headache, dizziness, syncope, weakness, numbness  Psychiatric:           No anxiety, depression   Endocrine:            No hot/cold intolerance, polydipsia  Heme/Lymph:      No bleeding, easy bruising  Allergic/Immune: No itchy eyes, rhinorrhea, hives angioedema      Current Meds:  acetaminophen   Tablet .. 650 milliGRAM(s) Oral every 6 hours PRN  allopurinol 300 milliGRAM(s) Oral daily  aspirin enteric coated 81 milliGRAM(s) Oral daily  atorvastatin 40 milliGRAM(s) Oral at bedtime  clopidogrel Tablet 75 milliGRAM(s) Oral daily  DULoxetine 60 milliGRAM(s) Oral daily  enoxaparin Injectable 40 milliGRAM(s) SubCutaneous every 24 hours  fluticasone propionate 50 MICROgram(s)/spray Nasal Spray 1 Spray(s) Both Nostrils two times a day  gabapentin 1200 milliGRAM(s) Oral three times a day  guaiFENesin    Syrup 200 milliGRAM(s) Oral every 8 hours  losartan 100 milliGRAM(s) Oral daily  primidone 250 milliGRAM(s) Oral three times a day  tamsulosin 0.4 milliGRAM(s) Oral at bedtime      PAST MEDICAL & SURGICAL HISTORY:  Spondylolisthesis  Familial tremor  Obesity (BMI 30-39.9)  Drug dependence: Chronic pain - opioid dependence, has completed suboxone therapy  - no opioids for last 5 months  Ventral hernia  Cervical arthritis  HTN (hypertension)  Testosterone deficiency  BPH (benign prostatic hypertrophy)  Hyperlipidemia  Gout  JOSSY (obstructive sleep apnea): noncompliant with cpap  Spinal stenosis of lumbar region  Deviated septum  Nasal polyps  Malignant melanoma of abdomen:  - s/p excision  History of lumbar laminectomy:  L4-L5  H/O Spinal Surgery:  Placement of battery pack for neurostimulator  History of laminectomy:  Cervical laminectomy for neurostimulator  Hx of LASIK: 6 years ago  Cervical disc disorder: anterior cervical diskectomy C6-C7 2001 posterior cervical cecompression and foraminectomy fusion and plating  2005 anterior cervical diskectomy and removal of plate and replated with fustion    cervical laminectomy  Neuropathy of hand: s/p insertion of neurostimulator in  secondary to neuropathy in the RIGHT wrist/forearm      Vitals:  T(F): 98 (-), Max: 98.1 ()  HR: 89 () (72 - 93)  BP: 133/72 () (123/85 - 160/93)  RR: 18 ()  SpO2: 94% ()  I&O's Summary    2019 07:01  -  2019 07:00  --------------------------------------------------------  IN: 360 mL / OUT: 0 mL / NET: 360 mL      Appearance: no acute distress  HEENT:   Normal oral mucosa, PERRL, EOMI	  Lymphatic: No lymphadenopathy  Cardiovascular: Regularly irregular, Normal S1 S2, No JVD, No murmurs, No edema  Respiratory: Lungs clear to auscultation	  Psychiatry: A & O x 3, Mood & affect appropriate  Gastrointestinal:  Soft, Non-tender, + BS	  Skin: No rashes, No ecchymoses, No cyanosis	  Neurologic: Non-focal  Extremities: Normal range of motion, No clubbing, cyanosis or edema  Vascular: Peripheral pulses palpable 2+ bilaterally                        15.9   11.8  )-----------( 213      ( 2019 08:08 )             45.6     02-07    140  |  101  |  17  ----------------------------<  111<H>  4.2   |  25  |  1.03    Ca    9.3      2019 08:08  Phos  3.5     02-06  Mg     1.9     02-06    TPro  6.6  /  Alb  3.6  /  TBili  0.2  /  DBili  x   /  AST  15  /  ALT  17  /  AlkPhos  139<H>      PT/INR - ( 2019 10:49 )   PT: 11.7 sec;   INR: 1.03 ratio         PTT - ( 2019 10:49 )  PTT:29.8 sec      Serum Pro-Brain Natriuretic Peptide: 391 pg/mL ( @ 13:50)    Total Cholesterol: 133  LDL: 59  HDL: 29  T    Hemoglobin A1C, Whole Blood: 5.4 % ( @ 10:49)        Interpretation of Telemetry: NSR, predominantly with trigeminy

## 2019-02-08 VITALS — HEART RATE: 80 BPM | DIASTOLIC BLOOD PRESSURE: 76 MMHG | SYSTOLIC BLOOD PRESSURE: 114 MMHG

## 2019-02-08 PROCEDURE — 87581 M.PNEUMON DNA AMP PROBE: CPT

## 2019-02-08 PROCEDURE — 99152 MOD SED SAME PHYS/QHP 5/>YRS: CPT

## 2019-02-08 PROCEDURE — 86900 BLOOD TYPING SEROLOGIC ABO: CPT

## 2019-02-08 PROCEDURE — 84484 ASSAY OF TROPONIN QUANT: CPT

## 2019-02-08 PROCEDURE — 94640 AIRWAY INHALATION TREATMENT: CPT

## 2019-02-08 PROCEDURE — 87486 CHLMYD PNEUM DNA AMP PROBE: CPT

## 2019-02-08 PROCEDURE — C1887: CPT

## 2019-02-08 PROCEDURE — 83880 ASSAY OF NATRIURETIC PEPTIDE: CPT

## 2019-02-08 PROCEDURE — 99285 EMERGENCY DEPT VISIT HI MDM: CPT | Mod: 25

## 2019-02-08 PROCEDURE — 93306 TTE W/DOPPLER COMPLETE: CPT

## 2019-02-08 PROCEDURE — 80061 LIPID PANEL: CPT

## 2019-02-08 PROCEDURE — 85027 COMPLETE CBC AUTOMATED: CPT

## 2019-02-08 PROCEDURE — 80048 BASIC METABOLIC PNL TOTAL CA: CPT

## 2019-02-08 PROCEDURE — 86850 RBC ANTIBODY SCREEN: CPT

## 2019-02-08 PROCEDURE — C1894: CPT

## 2019-02-08 PROCEDURE — 84443 ASSAY THYROID STIM HORMONE: CPT

## 2019-02-08 PROCEDURE — 84436 ASSAY OF TOTAL THYROXINE: CPT

## 2019-02-08 PROCEDURE — 80053 COMPREHEN METABOLIC PANEL: CPT

## 2019-02-08 PROCEDURE — 85730 THROMBOPLASTIN TIME PARTIAL: CPT

## 2019-02-08 PROCEDURE — C1769: CPT

## 2019-02-08 PROCEDURE — 71046 X-RAY EXAM CHEST 2 VIEWS: CPT

## 2019-02-08 PROCEDURE — 87633 RESP VIRUS 12-25 TARGETS: CPT

## 2019-02-08 PROCEDURE — 93458 L HRT ARTERY/VENTRICLE ANGIO: CPT | Mod: 59

## 2019-02-08 PROCEDURE — C1724: CPT

## 2019-02-08 PROCEDURE — 82607 VITAMIN B-12: CPT

## 2019-02-08 PROCEDURE — 99238 HOSP IP/OBS DSCHRG MGMT 30/<: CPT

## 2019-02-08 PROCEDURE — 99239 HOSP IP/OBS DSCHRG MGMT >30: CPT

## 2019-02-08 PROCEDURE — 99153 MOD SED SAME PHYS/QHP EA: CPT

## 2019-02-08 PROCEDURE — 83036 HEMOGLOBIN GLYCOSYLATED A1C: CPT

## 2019-02-08 PROCEDURE — 97161 PT EVAL LOW COMPLEX 20 MIN: CPT

## 2019-02-08 PROCEDURE — 86901 BLOOD TYPING SEROLOGIC RH(D): CPT

## 2019-02-08 PROCEDURE — C1725: CPT

## 2019-02-08 PROCEDURE — 36415 COLL VENOUS BLD VENIPUNCTURE: CPT

## 2019-02-08 PROCEDURE — 87798 DETECT AGENT NOS DNA AMP: CPT

## 2019-02-08 PROCEDURE — C9602: CPT | Mod: RC

## 2019-02-08 PROCEDURE — C1874: CPT

## 2019-02-08 PROCEDURE — 84480 ASSAY TRIIODOTHYRONINE (T3): CPT

## 2019-02-08 PROCEDURE — 81003 URINALYSIS AUTO W/O SCOPE: CPT

## 2019-02-08 PROCEDURE — 93005 ELECTROCARDIOGRAM TRACING: CPT

## 2019-02-08 PROCEDURE — 85610 PROTHROMBIN TIME: CPT

## 2019-02-08 PROCEDURE — 83735 ASSAY OF MAGNESIUM: CPT

## 2019-02-08 PROCEDURE — 84100 ASSAY OF PHOSPHORUS: CPT

## 2019-02-08 RX ADMIN — PRIMIDONE 250 MILLIGRAM(S): 250 TABLET ORAL at 13:35

## 2019-02-08 RX ADMIN — GABAPENTIN 1200 MILLIGRAM(S): 400 CAPSULE ORAL at 05:11

## 2019-02-08 RX ADMIN — CLOPIDOGREL BISULFATE 75 MILLIGRAM(S): 75 TABLET, FILM COATED ORAL at 11:27

## 2019-02-08 RX ADMIN — Medication 300 MILLIGRAM(S): at 11:27

## 2019-02-08 RX ADMIN — Medication 200 MILLIGRAM(S): at 05:11

## 2019-02-08 RX ADMIN — LOSARTAN POTASSIUM 100 MILLIGRAM(S): 100 TABLET, FILM COATED ORAL at 05:11

## 2019-02-08 RX ADMIN — GABAPENTIN 1200 MILLIGRAM(S): 400 CAPSULE ORAL at 13:35

## 2019-02-08 RX ADMIN — PRIMIDONE 250 MILLIGRAM(S): 250 TABLET ORAL at 05:11

## 2019-02-08 RX ADMIN — Medication 81 MILLIGRAM(S): at 11:27

## 2019-02-08 RX ADMIN — Medication 200 MILLIGRAM(S): at 13:35

## 2019-02-08 RX ADMIN — DULOXETINE HYDROCHLORIDE 60 MILLIGRAM(S): 30 CAPSULE, DELAYED RELEASE ORAL at 11:27

## 2019-02-08 RX ADMIN — Medication 1 SPRAY(S): at 05:11

## 2019-02-08 RX ADMIN — Medication 25 MILLIGRAM(S): at 17:25

## 2019-02-08 RX ADMIN — Medication 25 MILLIGRAM(S): at 05:12

## 2019-02-08 NOTE — PROGRESS NOTE ADULT - ATTENDING COMMENTS
Patient interviewed and examined. I was physically present for the essential portions of the E/M service provided.  Chart reviewed and note edited where appropriate.  Case discussed with fellow.  Agree w/ Assessment and Plan as outlined.    Gaston Cho MD [facility]  Spectra:  [phone]  Office: [phone]
Patient interviewed and examined. I was physically present for the essential portions of the E/M service provided.  Chart reviewed and note edited where appropriate.  Case discussed with fellow.  Agree w/ Assessment and Plan as outlined.    Gaston Cho MD [facility]  Spectra:  [phone]  Office: [phone]
Patient interviewed and examined. I was physically present for the essential portions of the E/M service provided.  Chart reviewed and note edited where appropriate.  Case discussed with fellow.  Agree w/ Assessment and Plan as outlined.    Gaston Cho MD [facility]  Spectra:  [phone]  Office: [phone]
Octavia Ochoa MD  Division of Hospital Medicine  Pager: [phone]  Office: [phone]
Octavia Ochoa MD  Division of Hospital Medicine  Pager: [phone]  Office: [phone]

## 2019-02-08 NOTE — PHARMACOTHERAPY INTERVENTION NOTE - COMMENTS
Pharmacy students Dyana KLINE and Savi FLETCHER counseled patient on discharge medication doses, indications, and possible side effects. Provided patient with medication list from [employer].

## 2019-02-08 NOTE — PROGRESS NOTE ADULT - SUBJECTIVE AND OBJECTIVE BOX
Patient seen and examined at bedside.    Overnight Events: Denies CP, SOB, LE edema. Continues to have sinus congestion. Has not walked the hallway yet.       REVIEW OF SYSTEMS:  Constitutional:     No fevers, chills, weight loss, weight gain  HEENT:                  see above  CV:                         No chest pain, palpitations, orthopnea, PND  Resp:                     No cough, SOB, dyspnea, wheezing, sputum  GI:                          No nausea, vomiting, abdominal pain, diarrhea, constipation  :                        No dysuria, nocturia, hematuria, increased urinary frequency  Musculoskeletal: No back pain, myalgias, arthralgias   Skin:                       No rash, pruritus, ecchymoses  Neurological:        No headache, dizziness, syncope, weakness, numbness  Psychiatric:           No anxiety, depression   Endocrine:            No hot/cold intolerance, polydipsia  Heme/Lymph:      No bleeding, easy bruising  Allergic/Immune: No itchy eyes, rhinorrhea, hives angioedema      Current Meds:  acetaminophen   Tablet .. 650 milliGRAM(s) Oral every 6 hours PRN  allopurinol 300 milliGRAM(s) Oral daily  aspirin enteric coated 81 milliGRAM(s) Oral daily  atorvastatin 40 milliGRAM(s) Oral at bedtime  clopidogrel Tablet 75 milliGRAM(s) Oral daily  DULoxetine 60 milliGRAM(s) Oral daily  enoxaparin Injectable 40 milliGRAM(s) SubCutaneous every 24 hours  fluticasone propionate 50 MICROgram(s)/spray Nasal Spray 1 Spray(s) Both Nostrils two times a day  gabapentin 1200 milliGRAM(s) Oral three times a day  guaiFENesin    Syrup 200 milliGRAM(s) Oral every 8 hours  losartan 100 milliGRAM(s) Oral daily  metoprolol tartrate 25 milliGRAM(s) Oral two times a day  primidone 250 milliGRAM(s) Oral three times a day  tamsulosin 0.4 milliGRAM(s) Oral at bedtime      PAST MEDICAL & SURGICAL HISTORY:  Spondylolisthesis  Familial tremor  Obesity (BMI 30-39.9)  Drug dependence: Chronic pain - opioid dependence, has completed suboxone therapy  - no opioids for last 5 months  Ventral hernia  Cervical arthritis  HTN (hypertension)  Testosterone deficiency  BPH (benign prostatic hypertrophy)  Hyperlipidemia  Gout  JOSSY (obstructive sleep apnea): noncompliant with cpap  Spinal stenosis of lumbar region  Deviated septum  Nasal polyps  Malignant melanoma of abdomen: 2014 - s/p excision  History of lumbar laminectomy: 2012 L4-L5  H/O Spinal Surgery: 2007 Placement of battery pack for neurostimulator  History of laminectomy: 2007 Cervical laminectomy for neurostimulator  Hx of LASIK: 6 years ago  Cervical disc disorder: anterior cervical diskectomy C6-C7 2001 2005 posterior cervical cecompression and foraminectomy fusion and plating  8/2005 anterior cervical diskectomy and removal of plate and replated with fustion   2007 cervical laminectomy  Neuropathy of hand: s/p insertion of neurostimulator in 2007 secondary to neuropathy in the RIGHT wrist/forearm      Vitals:  T(F): 98 (02-07), Max: 98 (02-07)  HR: 80 (02-07) (80 - 90)  BP: 128/71 (02-07) (125/69 - 129/80)  RR: 16 (02-07)  SpO2: 95% (02-07)  I&O's Summary    07 Feb 2019 07:01  -  08 Feb 2019 07:00  --------------------------------------------------------  IN: 880 mL / OUT: 750 mL / NET: 130 mL        Physical Exam:  Appearance: No acute distress; well appearing  Eyes: PERRL, EOMI, pink conjunctiva  HENT: Normal oral mucosa  Cardiovascular: RRR, S1, S2, no murmurs, rubs, or gallops; no edema  Respiratory: Clear to auscultation bilaterally  Gastrointestinal: soft, non-tender, non-distended with normal bowel sounds  Musculoskeletal: No clubbing; no joint deformity   Neurologic: Non-focal  Lymphatic: No lymphadenopathy  Psychiatry: AAOx3, mood & affect appropriate  Skin: No rashes, ecchymoses, or cyanosis                          15.9   11.8  )-----------( 213      ( 07 Feb 2019 08:08 )             45.6     02-07    140  |  101  |  17  ----------------------------<  111<H>  4.2   |  25  |  1.03    Ca    9.3      07 Feb 2019 08:08      PT/INR - ( 06 Feb 2019 10:49 )   PT: 11.7 sec;   INR: 1.03 ratio         PTT - ( 06 Feb 2019 10:49 )  PTT:29.8 sec      Serum Pro-Brain Natriuretic Peptide: 391 pg/mL (02-05 @ 13:50)    < from: Transthoracic Echocardiogram (02.07.19 @ 09:20) >  Conclusions:  1. Normal left ventricular internal dimensions and wall  thicknesses.  2. Endocardium not well visualized; grossly normal left  ventricular systolic function.  3. The right ventricle is not well visualized; grossly  normal right ventricular systolic function.  4. Estimated pulmonary artery systolic pressure equals 39  mm Hg, assuming right atrial pressure equals 8 mm Hg,  consistent with borderline pulmonary pressures.  *** No previous Echo exam.    < end of copied text >    Interpretation of Telemetry: SR 60s, no longer having persistent trigemny/bigeminy

## 2019-02-08 NOTE — CDI QUERY NOTE - NSCDIOTHERTXTBX_GEN_ALL_CORE_HH
Patient presented with "left side chest pain    "ECHO was done which showed LVEF 50% w/ mildly decreased global LV systolic funtion, Impaired relaxation of LV diastolic filling, trivial pericardial effusion, mild-mod tricuspid/aortic regurge, mild elevated Pulm artery systolic pressure. Pt also had a Sestamibi scan which showed focal anteroseptal predominantly fixed perfusion abnormality w/ adjacent area of mild foal anteroseptal ischemia. Mild inferolateral ischemia. On 2/3/19 pt had an EKG that showed PVC with bigeminy. Same reuslts were seen on EKG today"    Patient admitted with "Bigeminy" and had Left Heart Catheterization with Left Ventriculogram and CHAITANYA x1 to RCA on this admission.  Cardiac Cath Report "INDICATIONS: Unstable angina - CCS4. Abnormal stress test."    Discharge Summary Medication Review -   clopidogrel 75 mg oral tablet  -- 1 tab(s) by mouth once a day  -- Indication: For NSTEMI (non-ST elevated myocardial infarction)    metoprolol tartrate 25 mg oral tablet  -- 1 tab(s) by mouth 2 times a day  -- Indication: For NSTEMI (non-ST elevated myocardial infarction)    Principal Discharge DX:	NSTEMI (non-ST elevated myocardial infarction)  Goal:	stable  Assessment and plan of treatment:	s/p PCI   c/w DAPT  Secondary Diagnosis:	Bigeminal rhythm  Goal:	stable    Did this patient have a NSTEMI?  Please addendum the Hospital Course in the Discharge Summary and clarify the status of NSTEMI -    (1) Patient admitted with Begeminy, found to also have NSTEMI  (2) NSTEMI ruled out  (3) Other (specify)  (4) Clinically Undetermined    Thank you,    Lisbet Magdaleno RN

## 2019-02-08 NOTE — PHYSICAL THERAPY INITIAL EVALUATION ADULT - PERTINENT HX OF CURRENT PROBLEM, REHAB EVAL
75 yo M c/o L sided chest pain.  Pt says that he had a similar episode 1 month ago for which he went to [facility].  ECHO was done which showed LVEF 50% w/ mildly decreased global LV systolic funtion, Impaired relaxation of LV diastolic filling, trivial pericardial effusion, mild-mod tricuspid/aortic regurge, mild elevated Pulm artery systolic pressure. Echo on 2/3 showed PVC with bigeminy. Since then he has felt fine. Now s/p LHC 2/6 w/ CHAITANYA to RCA.

## 2019-02-08 NOTE — PHYSICAL THERAPY INITIAL EVALUATION ADULT - ADDITIONAL COMMENTS
Pt received FCI, however pt will be returning to private home with wife, 10 steps to enter +HR, additional 4 steps +HR to bedroom. Pt states he has been ambulating and performing functional mobility using cane, without assist, admits to being unsteady at times.

## 2019-02-08 NOTE — PROGRESS NOTE ADULT - ASSESSMENT
74M with PMH of HTN, HLD, JOSSY who presents from [facility] for evaluation of chest pain and abnormal EKG. Endorses URI symptoms, found to be coronavirus positive. Chest pain was in setting of coughing and he is currently CP free. EKG with bigeminy. Cardiac biomarkers negative. LHC on 2/6 with 80% stenosis of RCA.     #Persistent Bigeminy/trigeminy w/ effective bradycardia -- improving  - TTE grossly normal  - S/p LHC with CHAITANYA to RCA on 2/6  - Attempt to ambulate pt in hallway to assess for suppression of ectopy  - Continue metoprolol tartrate 25 mg PO BID -- switch to metop succinate at discharge    #CAD  - S/p LHC with CHAITANYA to RCA on 2/6  - Continue ASA, Plavix, statin  - Patient should f/u with a cardiologist in 1-2 weeks after discharge. He would like to follow with Dr. Venita Eugene for discharge from cardiac standpoint. Cardiology will sign off. Please call with additional questions.     JOVANNY Ascencio  Cardiology Fellow   [phone]

## 2019-02-08 NOTE — PHYSICAL THERAPY INITIAL EVALUATION ADULT - PLANNED THERAPY INTERVENTIONS, PT EVAL
gait training/balance training/bed mobility training/transfer training/GOAL: Pt will negotiate 10 steps with 1 HR and step to pattern independently in 4 weeks.

## 2019-02-08 NOTE — CHART NOTE - NSCHARTNOTEFT_GEN_A_CORE
seen at bedside denies complaints today, still w/ mild URI symptoms.  vss, tele reviewed sinus rhythm w/ pvcs, PE unchanged from yesterday.  Labs reviewed. TTE grossly unremarkable.   IMP: Unstable angina now s/p CHAITANYA to RCA  - tolerating meds, c/w bb, arb, asa, plavix, statin  - seen by PT for unsteady gait - for home PT and prescription for walker written.  - d/c home today. d/c time 37 min. f/u cardiology Dr Rodriguez in 1 week. all ques answered. see d/c summary.

## 2019-02-09 LAB — VIT B12 SERPL-MCNC: 562 PG/ML — SIGNIFICANT CHANGE UP (ref 232–1245)

## 2019-02-14 ENCOUNTER — NON-APPOINTMENT (OUTPATIENT)
Age: 74
End: 2019-02-14

## 2019-02-14 ENCOUNTER — CHART COPY (OUTPATIENT)
Age: 74
End: 2019-02-14

## 2019-02-14 ENCOUNTER — APPOINTMENT (OUTPATIENT)
Dept: CARDIOLOGY | Facility: CLINIC | Age: 74
End: 2019-02-14
Payer: MEDICARE

## 2019-02-14 VITALS
BODY MASS INDEX: 35.79 KG/M2 | HEIGHT: 67 IN | SYSTOLIC BLOOD PRESSURE: 136 MMHG | OXYGEN SATURATION: 97 % | WEIGHT: 228 LBS | HEART RATE: 63 BPM | DIASTOLIC BLOOD PRESSURE: 86 MMHG

## 2019-02-14 DIAGNOSIS — M10.9 GOUT, UNSPECIFIED: ICD-10-CM

## 2019-02-14 DIAGNOSIS — Z80.1 FAMILY HISTORY OF MALIGNANT NEOPLASM OF TRACHEA, BRONCHUS AND LUNG: ICD-10-CM

## 2019-02-14 DIAGNOSIS — M48.061 SPINAL STENOSIS, LUMBAR REGION WITHOUT NEUROGENIC CLAUDICATION: ICD-10-CM

## 2019-02-14 DIAGNOSIS — N40.0 BENIGN PROSTATIC HYPERPLASIA WITHOUT LOWER URINARY TRACT SYMPMS: ICD-10-CM

## 2019-02-14 PROCEDURE — 93000 ELECTROCARDIOGRAM COMPLETE: CPT

## 2019-02-14 PROCEDURE — 99215 OFFICE O/P EST HI 40 MIN: CPT

## 2019-02-14 RX ORDER — PRIMIDONE 250 MG/1
250 TABLET ORAL 3 TIMES DAILY
Refills: 0 | Status: ACTIVE | COMMUNITY
Start: 2019-02-14

## 2019-02-14 NOTE — HISTORY OF PRESENT ILLNESS
[FreeTextEntry1] : Toi is a 74-year-old gentleman hypertension, hyperlipidemia, CAD s/p orbital atherectomy and mid RCA stent for UA who is here for f/u. Denies any further chest pain, palpitations or shortness of breath. \par \par He had an injury during epidural and right leg weak. He was in longterm and fell. Went to [facility] found to have bradycardia which led to transfer to [facility] for cath.

## 2019-02-14 NOTE — PHYSICAL EXAM
[General Appearance - Well Developed] : well developed [Normal Appearance] : normal appearance [Well Groomed] : well groomed [General Appearance - Well Nourished] : well nourished [No Deformities] : no deformities [General Appearance - In No Acute Distress] : no acute distress [Normal Conjunctiva] : the conjunctiva exhibited no abnormalities [Eyelids - No Xanthelasma] : the eyelids demonstrated no xanthelasmas [Normal Oral Mucosa] : normal oral mucosa [No Oral Pallor] : no oral pallor [No Oral Cyanosis] : no oral cyanosis [Normal Jugular Venous A Waves Present] : normal jugular venous A waves present [Normal Jugular Venous V Waves Present] : normal jugular venous V waves present [No Jugular Venous Herman A Waves] : no jugular venous herman A waves [Heart Rate And Rhythm] : heart rate and rhythm were normal [Heart Sounds] : normal S1 and S2 [Murmurs] : no murmurs present [Respiration, Rhythm And Depth] : normal respiratory rhythm and effort [Exaggerated Use Of Accessory Muscles For Inspiration] : no accessory muscle use [Auscultation Breath Sounds / Voice Sounds] : lungs were clear to auscultation bilaterally [Abdomen Soft] : soft [Abdomen Tenderness] : non-tender [Abdomen Mass (___ Cm)] : no abdominal mass palpated [Abnormal Walk] : normal gait [Gait - Sufficient For Exercise Testing] : the gait was sufficient for exercise testing [Nail Clubbing] : no clubbing of the fingernails [Cyanosis, Localized] : no localized cyanosis [Petechial Hemorrhages (___cm)] : no petechial hemorrhages [FreeTextEntry1] : decrease motor strength RLE [Skin Color & Pigmentation] : normal skin color and pigmentation [] : no rash [No Venous Stasis] : no venous stasis [Skin Lesions] : no skin lesions [No Skin Ulcers] : no skin ulcer [No Xanthoma] : no  xanthoma was observed [Oriented To Time, Place, And Person] : oriented to person, place, and time [Affect] : the affect was normal [Mood] : the mood was normal [No Anxiety] : not feeling anxious

## 2019-02-14 NOTE — DISCUSSION/SUMMARY
[___ Month(s)] : [unfilled] month(s) [FreeTextEntry1] : The patient is a 74-year-old gentleman spinal stenosis, RLE weakness, hypertension, hyperlipidemia, CAD s/p RCA stent for UA who is improving. \par #1 CV- on DAPT, no angina or HF, nl EF\par #2 Htn- on losartan, metoprolol but may discontinue after six months\par #3 Lipids- on atorvastatin, labs 203 months\par #4 Neuro- home PT for RLE weakness, ambulates with walker\par #5 Gout- on allopurinol\par #6 General- dietary modifications reviewed, hold on dental work til next month, use of recumbent bicycle for exercise

## 2019-05-14 ENCOUNTER — APPOINTMENT (OUTPATIENT)
Dept: CARDIOLOGY | Facility: CLINIC | Age: 74
End: 2019-05-14
Payer: MEDICARE

## 2019-05-14 VITALS
OXYGEN SATURATION: 94 % | WEIGHT: 210 LBS | SYSTOLIC BLOOD PRESSURE: 161 MMHG | HEIGHT: 67 IN | DIASTOLIC BLOOD PRESSURE: 84 MMHG | HEART RATE: 57 BPM | BODY MASS INDEX: 32.96 KG/M2

## 2019-05-14 PROCEDURE — 93000 ELECTROCARDIOGRAM COMPLETE: CPT

## 2019-05-14 PROCEDURE — 99214 OFFICE O/P EST MOD 30 MIN: CPT

## 2019-05-14 RX ORDER — LOSARTAN POTASSIUM 100 MG/1
100 TABLET, FILM COATED ORAL
Refills: 3 | Status: DISCONTINUED | COMMUNITY
Start: 2019-02-14 | End: 2019-05-14

## 2019-05-14 NOTE — PHYSICAL EXAM
[General Appearance - Well Developed] : well developed [Normal Appearance] : normal appearance [Well Groomed] : well groomed [General Appearance - Well Nourished] : well nourished [No Deformities] : no deformities [General Appearance - In No Acute Distress] : no acute distress [Normal Conjunctiva] : the conjunctiva exhibited no abnormalities [Eyelids - No Xanthelasma] : the eyelids demonstrated no xanthelasmas [Normal Oral Mucosa] : normal oral mucosa [No Oral Pallor] : no oral pallor [No Oral Cyanosis] : no oral cyanosis [Normal Jugular Venous A Waves Present] : normal jugular venous A waves present [Normal Jugular Venous V Waves Present] : normal jugular venous V waves present [No Jugular Venous Herman A Waves] : no jugular venous herman A waves [Respiration, Rhythm And Depth] : normal respiratory rhythm and effort [Auscultation Breath Sounds / Voice Sounds] : lungs were clear to auscultation bilaterally [Exaggerated Use Of Accessory Muscles For Inspiration] : no accessory muscle use [Heart Rate And Rhythm] : heart rate and rhythm were normal [Heart Sounds] : normal S1 and S2 [Murmurs] : no murmurs present [Abdomen Soft] : soft [Abdomen Tenderness] : non-tender [Abdomen Mass (___ Cm)] : no abdominal mass palpated [Abnormal Walk] : normal gait [Gait - Sufficient For Exercise Testing] : the gait was sufficient for exercise testing [Nail Clubbing] : no clubbing of the fingernails [Cyanosis, Localized] : no localized cyanosis [Petechial Hemorrhages (___cm)] : no petechial hemorrhages [Skin Color & Pigmentation] : normal skin color and pigmentation [] : no rash [No Venous Stasis] : no venous stasis [Skin Lesions] : no skin lesions [No Skin Ulcers] : no skin ulcer [No Xanthoma] : no  xanthoma was observed [Oriented To Time, Place, And Person] : oriented to person, place, and time [Affect] : the affect was normal [Mood] : the mood was normal [No Anxiety] : not feeling anxious [FreeTextEntry1] : decrease motor strength RLE

## 2019-05-14 NOTE — DISCUSSION/SUMMARY
[___ Month(s)] : [unfilled] month(s) [FreeTextEntry1] : The patient is a 74-year-old gentleman spinal stenosis, RLE weakness, hypertension, hyperlipidemia, CAD s/p RCA stent for UA with hypertension. \par #1 CV- on DAPT, no angina or HF, nl EF, holter to assess HR control\par #2 Htn- increase losartan 100/12.5mg\par #3 Lipids- on atorvastatin, labs 203 months\par #4 Neuro- home PT for RLE weakness, ambulates with walker\par #5 Gout- on allopurinol\par #6 General- dietary modifications reviewed, use of recumbent bicycle for exercise

## 2019-05-14 NOTE — HISTORY OF PRESENT ILLNESS
[FreeTextEntry1] : Toi is a 74-year-old gentleman hypertension, hyperlipidemia, CAD s/p orbital atherectomy and mid RCA stent for UA who has noticed increase BP since decrease beta blocker.  Denies any chest pain, palpitations or shortness of breath. \par \par He had an injury during epidural and right leg weak. He was in [facility] and fell. Went to [facility] found to have bradycardia which led to transfer to [facility] for cath.

## 2019-05-23 ENCOUNTER — TRANSCRIPTION ENCOUNTER (OUTPATIENT)
Age: 74
End: 2019-05-23

## 2019-07-23 NOTE — ED ADULT NURSE NOTE - ED CARDIAC HEART SOUNDS
Nurse note from patient's weekly VLCD / LCD / Maintenance class was reviewed. Pertinent medical concerns were:   none     BP Readings from Last 3 Encounters:   07/19/19 123/86   07/10/19 117/80   07/10/19 117/83       Weight Metrics 7/16/2019 7/10/2019 7/10/2019 7/9/2019 7/2/2019 7/1/2019 6/25/2019   Weight 178 lb 12.8 oz - 182 lb 3.2 oz 181 lb 9.6 oz 180 lb 9.6 oz 175 lb 12.8 oz 175 lb 12.8 oz   Neck Circ (inches) - 14.5 - - - - -   Waist Measure Inches - 39.5 - - - - -   Exercise Mins/week - - - - - - -   BMI 32.7 kg/m2 - 33.32 kg/m2 33.22 kg/m2 33.03 kg/m2 32.15 kg/m2 32.15 kg/m2       Current Outpatient Medications   Medication Sig Dispense Refill    diclofenac (VOLTAREN) 1 % gel Apply 2 g to affected area four (4) times daily. 1 Each 3    estradiol (CLIMARA) 0.05 mg/24 hr APPLY 1 PATCH TO SKIN EVERY SATURDAY 12 Patch 1    hydroCHLOROthiazide (HYDRODIURIL) 25 mg tablet TAKE 1 TABLET BY MOUTH EVERY DAY 30 Tab 2    cyclobenzaprine (FLEXERIL) 5 mg tablet Take 1 Tab by mouth nightly as needed for Muscle Spasm(s).  30 Tab 1 normal S1, S2 heard

## 2019-08-20 ENCOUNTER — NON-APPOINTMENT (OUTPATIENT)
Age: 74
End: 2019-08-20

## 2019-08-20 ENCOUNTER — APPOINTMENT (OUTPATIENT)
Dept: CARDIOLOGY | Facility: CLINIC | Age: 74
End: 2019-08-20
Payer: MEDICARE

## 2019-08-20 VITALS
HEIGHT: 67 IN | WEIGHT: 218 LBS | OXYGEN SATURATION: 95 % | HEART RATE: 86 BPM | SYSTOLIC BLOOD PRESSURE: 117 MMHG | BODY MASS INDEX: 34.21 KG/M2 | DIASTOLIC BLOOD PRESSURE: 82 MMHG

## 2019-08-20 PROCEDURE — 99214 OFFICE O/P EST MOD 30 MIN: CPT

## 2019-08-20 PROCEDURE — 93000 ELECTROCARDIOGRAM COMPLETE: CPT

## 2019-08-20 NOTE — HISTORY OF PRESENT ILLNESS
[FreeTextEntry1] : Toi is a 74-year-old gentleman hypertension, hyperlipidemia, CAD s/p orbital atherectomy and mid RCA stent for UA who is feeling better.  Denies any chest pain, palpitations or shortness of breath. \par \par

## 2019-08-20 NOTE — DISCUSSION/SUMMARY
[___ Month(s)] : [unfilled] month(s) [FreeTextEntry1] : The patient is a 74-year-old gentleman spinal stenosis, RLE weakness, hypertension, hyperlipidemia, CAD s/p RCA stent for UA who is improving \par #1 CV- on DAPT, no angina or HF, nl EF\par #2 Htn- on losartan 100/12.5mg\par #3 Lipids- on atorvastatin, labs next visit\par #4 Neuro- home PT for RLE weakness, ambulates with cane/walker\par #5 Gout- on allopurinol\par #6 General- dietary modifications reviewed, use of recumbent bicycle for exercise

## 2019-08-20 NOTE — PHYSICAL EXAM
[General Appearance - Well Developed] : well developed [Well Groomed] : well groomed [Normal Appearance] : normal appearance [No Deformities] : no deformities [General Appearance - Well Nourished] : well nourished [General Appearance - In No Acute Distress] : no acute distress [Normal Conjunctiva] : the conjunctiva exhibited no abnormalities [Eyelids - No Xanthelasma] : the eyelids demonstrated no xanthelasmas [Normal Oral Mucosa] : normal oral mucosa [No Oral Pallor] : no oral pallor [No Oral Cyanosis] : no oral cyanosis [Normal Jugular Venous V Waves Present] : normal jugular venous V waves present [Normal Jugular Venous A Waves Present] : normal jugular venous A waves present [No Jugular Venous Herman A Waves] : no jugular venous herman A waves [Respiration, Rhythm And Depth] : normal respiratory rhythm and effort [Exaggerated Use Of Accessory Muscles For Inspiration] : no accessory muscle use [Auscultation Breath Sounds / Voice Sounds] : lungs were clear to auscultation bilaterally [Heart Sounds] : normal S1 and S2 [Heart Rate And Rhythm] : heart rate and rhythm were normal [Murmurs] : no murmurs present [Abdomen Soft] : soft [Abdomen Tenderness] : non-tender [Abdomen Mass (___ Cm)] : no abdominal mass palpated [Abnormal Walk] : normal gait [Gait - Sufficient For Exercise Testing] : the gait was sufficient for exercise testing [Nail Clubbing] : no clubbing of the fingernails [Cyanosis, Localized] : no localized cyanosis [Petechial Hemorrhages (___cm)] : no petechial hemorrhages [Skin Color & Pigmentation] : normal skin color and pigmentation [] : no rash [No Venous Stasis] : no venous stasis [No Skin Ulcers] : no skin ulcer [Skin Lesions] : no skin lesions [Oriented To Time, Place, And Person] : oriented to person, place, and time [No Xanthoma] : no  xanthoma was observed [Affect] : the affect was normal [Mood] : the mood was normal [No Anxiety] : not feeling anxious [FreeTextEntry1] : decrease motor strength RLE

## 2019-12-17 ENCOUNTER — APPOINTMENT (OUTPATIENT)
Dept: CARDIOLOGY | Facility: CLINIC | Age: 74
End: 2019-12-17
Payer: MEDICARE

## 2019-12-17 VITALS
WEIGHT: 212 LBS | OXYGEN SATURATION: 94 % | DIASTOLIC BLOOD PRESSURE: 85 MMHG | SYSTOLIC BLOOD PRESSURE: 125 MMHG | HEART RATE: 78 BPM | HEIGHT: 67 IN | BODY MASS INDEX: 33.27 KG/M2

## 2019-12-17 LAB
ALBUMIN SERPL ELPH-MCNC: 4 G/DL
ALP BLD-CCNC: 162 U/L
ALT SERPL-CCNC: 15 U/L
ANION GAP SERPL CALC-SCNC: 12 MMOL/L
AST SERPL-CCNC: 19 U/L
BASOPHILS # BLD AUTO: 0.05 K/UL
BASOPHILS NFR BLD AUTO: 0.6 %
BILIRUB SERPL-MCNC: 0.3 MG/DL
BUN SERPL-MCNC: 22 MG/DL
CALCIUM SERPL-MCNC: 9.7 MG/DL
CHLORIDE SERPL-SCNC: 102 MMOL/L
CHOLEST SERPL-MCNC: 130 MG/DL
CHOLEST/HDLC SERPL: 3 RATIO
CO2 SERPL-SCNC: 26 MMOL/L
CREAT SERPL-MCNC: 1.09 MG/DL
EOSINOPHIL # BLD AUTO: 0.39 K/UL
EOSINOPHIL NFR BLD AUTO: 4.4 %
ESTIMATED AVERAGE GLUCOSE: 100 MG/DL
GLUCOSE SERPL-MCNC: 87 MG/DL
HBA1C MFR BLD HPLC: 5.1 %
HCT VFR BLD CALC: 47.5 %
HDLC SERPL-MCNC: 44 MG/DL
HGB BLD-MCNC: 15.1 G/DL
IMM GRANULOCYTES NFR BLD AUTO: 0.5 %
LDLC SERPL CALC-MCNC: 61 MG/DL
LYMPHOCYTES # BLD AUTO: 1.44 K/UL
LYMPHOCYTES NFR BLD AUTO: 16.3 %
MAN DIFF?: NORMAL
MCHC RBC-ENTMCNC: 31.8 GM/DL
MCHC RBC-ENTMCNC: 32.6 PG
MCV RBC AUTO: 102.6 FL
MONOCYTES # BLD AUTO: 0.7 K/UL
MONOCYTES NFR BLD AUTO: 7.9 %
NEUTROPHILS # BLD AUTO: 6.21 K/UL
NEUTROPHILS NFR BLD AUTO: 70.3 %
PLATELET # BLD AUTO: 235 K/UL
POTASSIUM SERPL-SCNC: 5 MMOL/L
PROT SERPL-MCNC: 6.6 G/DL
RBC # BLD: 4.63 M/UL
RBC # FLD: 14.1 %
SODIUM SERPL-SCNC: 140 MMOL/L
TRIGL SERPL-MCNC: 123 MG/DL
WBC # FLD AUTO: 8.83 K/UL

## 2019-12-17 PROCEDURE — 99214 OFFICE O/P EST MOD 30 MIN: CPT

## 2019-12-17 PROCEDURE — 93000 ELECTROCARDIOGRAM COMPLETE: CPT

## 2019-12-17 NOTE — DISCUSSION/SUMMARY
[___ Month(s)] : [unfilled] month(s) [FreeTextEntry1] : The patient is a 74-year-old gentleman spinal stenosis, RLE weakness, hypertension, hyperlipidemia, CAD s/p RCA stent for UA who is improving \par #1 CV- on DAPT, no angina or HF, nl EF\par #2 Htn- on losartan 100/12.5mg\par #3 Lipids- on atorvastatin, labs today\par #4 Neuro- home PT for RLE weakness, ambulates with cane/walker\par #5 Gout- on allopurinol\par #6 General- dietary modifications reviewed, use of recumbent bicycle for exercise

## 2019-12-17 NOTE — PHYSICAL EXAM
[General Appearance - Well Developed] : well developed [Normal Appearance] : normal appearance [Well Groomed] : well groomed [General Appearance - Well Nourished] : well nourished [No Deformities] : no deformities [General Appearance - In No Acute Distress] : no acute distress [Normal Conjunctiva] : the conjunctiva exhibited no abnormalities [Eyelids - No Xanthelasma] : the eyelids demonstrated no xanthelasmas [Normal Oral Mucosa] : normal oral mucosa [No Oral Pallor] : no oral pallor [No Oral Cyanosis] : no oral cyanosis [Normal Jugular Venous A Waves Present] : normal jugular venous A waves present [Normal Jugular Venous V Waves Present] : normal jugular venous V waves present [No Jugular Venous Herman A Waves] : no jugular venous herman A waves [Respiration, Rhythm And Depth] : normal respiratory rhythm and effort [Exaggerated Use Of Accessory Muscles For Inspiration] : no accessory muscle use [Auscultation Breath Sounds / Voice Sounds] : lungs were clear to auscultation bilaterally [Heart Rate And Rhythm] : heart rate and rhythm were normal [Heart Sounds] : normal S1 and S2 [Abdomen Soft] : soft [Murmurs] : no murmurs present [Abdomen Tenderness] : non-tender [Abdomen Mass (___ Cm)] : no abdominal mass palpated [Abnormal Walk] : normal gait [Gait - Sufficient For Exercise Testing] : the gait was sufficient for exercise testing [Nail Clubbing] : no clubbing of the fingernails [Cyanosis, Localized] : no localized cyanosis [Petechial Hemorrhages (___cm)] : no petechial hemorrhages [FreeTextEntry1] : decrease motor strength RLE [Skin Color & Pigmentation] : normal skin color and pigmentation [] : no rash [No Venous Stasis] : no venous stasis [Skin Lesions] : no skin lesions [No Skin Ulcers] : no skin ulcer [No Xanthoma] : no  xanthoma was observed [Oriented To Time, Place, And Person] : oriented to person, place, and time [Affect] : the affect was normal [Mood] : the mood was normal [No Anxiety] : not feeling anxious

## 2019-12-18 LAB — 25(OH)D3 SERPL-MCNC: 45 NG/ML

## 2020-05-05 ENCOUNTER — APPOINTMENT (OUTPATIENT)
Dept: CARDIOLOGY | Facility: CLINIC | Age: 75
End: 2020-05-05

## 2020-06-02 ENCOUNTER — APPOINTMENT (OUTPATIENT)
Dept: CARDIOLOGY | Facility: CLINIC | Age: 75
End: 2020-06-02

## 2020-06-04 ENCOUNTER — APPOINTMENT (OUTPATIENT)
Dept: CARDIOLOGY | Facility: CLINIC | Age: 75
End: 2020-06-04
Payer: MEDICARE

## 2020-06-04 VITALS
WEIGHT: 225 LBS | HEIGHT: 67 IN | DIASTOLIC BLOOD PRESSURE: 79 MMHG | SYSTOLIC BLOOD PRESSURE: 165 MMHG | BODY MASS INDEX: 35.31 KG/M2 | HEART RATE: 80 BPM | OXYGEN SATURATION: 95 %

## 2020-06-04 PROCEDURE — 99214 OFFICE O/P EST MOD 30 MIN: CPT

## 2020-06-04 PROCEDURE — 93000 ELECTROCARDIOGRAM COMPLETE: CPT

## 2020-06-04 NOTE — DISCUSSION/SUMMARY
[FreeTextEntry1] : The patient is a 74-year-old gentleman spinal stenosis, RLE weakness, hypertension, hyperlipidemia, CAD who has gained weight.\par #1 CV- on DAPT, no angina or HF, nl EF\par #2 Htn- on losartan 100/12.5mg\par #3 Lipids- on atorvastatin, labs today\par #4 Neuro- RLE weakness, ambulates with cane/walker\par #5 Gout- on allopurinol\par #6 General- dietary modifications reviewed, weight loss encouraged, use of recumbent bicycle for exercise [___ Month(s)] : [unfilled] month(s)

## 2020-06-04 NOTE — PHYSICAL EXAM
[General Appearance - Well Developed] : well developed [Normal Appearance] : normal appearance [Well Groomed] : well groomed [General Appearance - Well Nourished] : well nourished [No Deformities] : no deformities [General Appearance - In No Acute Distress] : no acute distress [Normal Conjunctiva] : the conjunctiva exhibited no abnormalities [Eyelids - No Xanthelasma] : the eyelids demonstrated no xanthelasmas [Normal Oral Mucosa] : normal oral mucosa [No Oral Pallor] : no oral pallor [Normal Jugular Venous A Waves Present] : normal jugular venous A waves present [No Oral Cyanosis] : no oral cyanosis [Normal Jugular Venous V Waves Present] : normal jugular venous V waves present [No Jugular Venous Herman A Waves] : no jugular venous herman A waves [Respiration, Rhythm And Depth] : normal respiratory rhythm and effort [Exaggerated Use Of Accessory Muscles For Inspiration] : no accessory muscle use [Heart Rate And Rhythm] : heart rate and rhythm were normal [Auscultation Breath Sounds / Voice Sounds] : lungs were clear to auscultation bilaterally [Heart Sounds] : normal S1 and S2 [Murmurs] : no murmurs present [Abdomen Soft] : soft [Abdomen Tenderness] : non-tender [Abnormal Walk] : normal gait [Abdomen Mass (___ Cm)] : no abdominal mass palpated [Gait - Sufficient For Exercise Testing] : the gait was sufficient for exercise testing [Nail Clubbing] : no clubbing of the fingernails [Cyanosis, Localized] : no localized cyanosis [FreeTextEntry1] : decrease motor strength RLE [Petechial Hemorrhages (___cm)] : no petechial hemorrhages [Skin Color & Pigmentation] : normal skin color and pigmentation [] : no rash [No Venous Stasis] : no venous stasis [Skin Lesions] : no skin lesions [No Skin Ulcers] : no skin ulcer [No Xanthoma] : no  xanthoma was observed [Oriented To Time, Place, And Person] : oriented to person, place, and time [Affect] : the affect was normal [Mood] : the mood was normal [No Anxiety] : not feeling anxious

## 2020-06-04 NOTE — HISTORY OF PRESENT ILLNESS
[FreeTextEntry1] : Toi is a 74-year-old gentleman hypertension, hyperlipidemia, CAD s/p orbital atherectomy and mid RCA stent for UA who is now working at [employer]. He struggles on his feet because of back pain and ambulates with cane.   Denies any chest pain, palpitations or shortness of breath. \par \par

## 2020-12-07 ENCOUNTER — FORM ENCOUNTER (OUTPATIENT)
Age: 75
End: 2020-12-07

## 2020-12-08 ENCOUNTER — APPOINTMENT (OUTPATIENT)
Dept: CARDIOLOGY | Facility: CLINIC | Age: 75
End: 2020-12-08
Payer: MEDICARE

## 2020-12-08 ENCOUNTER — NON-APPOINTMENT (OUTPATIENT)
Age: 75
End: 2020-12-08

## 2020-12-08 ENCOUNTER — APPOINTMENT (OUTPATIENT)
Dept: ELECTROPHYSIOLOGY | Facility: CLINIC | Age: 75
End: 2020-12-08
Payer: MEDICARE

## 2020-12-08 VITALS
BODY MASS INDEX: 34.46 KG/M2 | SYSTOLIC BLOOD PRESSURE: 153 MMHG | WEIGHT: 220 LBS | HEART RATE: 82 BPM | OXYGEN SATURATION: 97 % | DIASTOLIC BLOOD PRESSURE: 82 MMHG

## 2020-12-08 VITALS — DIASTOLIC BLOOD PRESSURE: 80 MMHG | SYSTOLIC BLOOD PRESSURE: 134 MMHG

## 2020-12-08 PROCEDURE — 0296T: CPT

## 2020-12-08 PROCEDURE — 99072 ADDL SUPL MATRL&STAF TM PHE: CPT

## 2020-12-08 PROCEDURE — 99214 OFFICE O/P EST MOD 30 MIN: CPT

## 2020-12-08 PROCEDURE — 93000 ELECTROCARDIOGRAM COMPLETE: CPT

## 2020-12-08 RX ORDER — CLOPIDOGREL BISULFATE 75 MG/1
75 TABLET, FILM COATED ORAL DAILY
Qty: 7 | Refills: 0 | Status: DISCONTINUED | COMMUNITY
Start: 2019-02-14 | End: 2020-12-08

## 2020-12-08 RX ORDER — METOPROLOL TARTRATE 25 MG/1
25 TABLET, FILM COATED ORAL TWICE DAILY
Qty: 60 | Refills: 5 | Status: DISCONTINUED | COMMUNITY
Start: 2019-02-14 | End: 2020-12-08

## 2020-12-08 NOTE — HISTORY OF PRESENT ILLNESS
[FreeTextEntry1] : Toi is a 75-year-old gentleman hypertension, hyperlipidemia, CAD s/p orbital atherectomy and mid RCA stent for UA who is now working at [employer].   Denies any chest pain, palpitations or shortness of breath. \par \par

## 2020-12-08 NOTE — PHYSICAL EXAM
[General Appearance - Well Developed] : well developed [Normal Appearance] : normal appearance [Well Groomed] : well groomed [General Appearance - Well Nourished] : well nourished [No Deformities] : no deformities [General Appearance - In No Acute Distress] : no acute distress [Normal Conjunctiva] : the conjunctiva exhibited no abnormalities [Eyelids - No Xanthelasma] : the eyelids demonstrated no xanthelasmas [Normal Oral Mucosa] : normal oral mucosa [No Oral Pallor] : no oral pallor [No Oral Cyanosis] : no oral cyanosis [Normal Jugular Venous A Waves Present] : normal jugular venous A waves present [Normal Jugular Venous V Waves Present] : normal jugular venous V waves present [No Jugular Venous Herman A Waves] : no jugular venous herman A waves [Respiration, Rhythm And Depth] : normal respiratory rhythm and effort [Exaggerated Use Of Accessory Muscles For Inspiration] : no accessory muscle use [Auscultation Breath Sounds / Voice Sounds] : lungs were clear to auscultation bilaterally [Heart Rate And Rhythm] : heart rate and rhythm were normal [Heart Sounds] : normal S1 and S2 [Murmurs] : no murmurs present [Abdomen Soft] : soft [Abdomen Tenderness] : non-tender [Abdomen Mass (___ Cm)] : no abdominal mass palpated [Abnormal Walk] : normal gait [Gait - Sufficient For Exercise Testing] : the gait was sufficient for exercise testing [Nail Clubbing] : no clubbing of the fingernails [Cyanosis, Localized] : no localized cyanosis [Petechial Hemorrhages (___cm)] : no petechial hemorrhages [Skin Color & Pigmentation] : normal skin color and pigmentation [] : no rash [No Venous Stasis] : no venous stasis [Skin Lesions] : no skin lesions [No Skin Ulcers] : no skin ulcer [No Xanthoma] : no  xanthoma was observed [Oriented To Time, Place, And Person] : oriented to person, place, and time [Affect] : the affect was normal [Mood] : the mood was normal [No Anxiety] : not feeling anxious [FreeTextEntry1] : decrease motor strength RLE

## 2020-12-08 NOTE — DISCUSSION/SUMMARY
[___ Month(s)] : [unfilled] month(s) [FreeTextEntry1] : The patient is a 75-year-old gentleman spinal stenosis, RLE weakness, hypertension, hyperlipidemia, CAD with frequent PVCs not on beta blocker.\par #1 CV- on DAPT, no angina or HF, nl EF, may stop plavix, monitor for ectopy and restart toprol 25mg daily\par #2 Htn- on losartan 100/12.5mg\par #3 Lipids- on atorvastatin, labs today\par #4 Neuro- RLE weakness, ambulates with cane/walker\par #5 Gout- on allopurinol\par #6 General- dietary modifications reviewed, weight loss encouraged, working at [employer] part time.

## 2020-12-09 LAB
25(OH)D3 SERPL-MCNC: 62.6 NG/ML
ALBUMIN SERPL ELPH-MCNC: 4.3 G/DL
ALP BLD-CCNC: 168 U/L
ALT SERPL-CCNC: 25 U/L
ANION GAP SERPL CALC-SCNC: 11 MMOL/L
AST SERPL-CCNC: 26 U/L
BASOPHILS # BLD AUTO: 0.06 K/UL
BASOPHILS NFR BLD AUTO: 0.6 %
BILIRUB SERPL-MCNC: 0.2 MG/DL
BUN SERPL-MCNC: 35 MG/DL
CALCIUM SERPL-MCNC: 9.9 MG/DL
CHLORIDE SERPL-SCNC: 101 MMOL/L
CHOLEST SERPL-MCNC: 154 MG/DL
CO2 SERPL-SCNC: 27 MMOL/L
CREAT SERPL-MCNC: 1.33 MG/DL
EOSINOPHIL # BLD AUTO: 0.66 K/UL
EOSINOPHIL NFR BLD AUTO: 6.3 %
ESTIMATED AVERAGE GLUCOSE: 105 MG/DL
GLUCOSE SERPL-MCNC: 93 MG/DL
HBA1C MFR BLD HPLC: 5.3 %
HCT VFR BLD CALC: 46.3 %
HDLC SERPL-MCNC: 49 MG/DL
HGB BLD-MCNC: 15 G/DL
IMM GRANULOCYTES NFR BLD AUTO: 0.4 %
LDLC SERPL CALC-MCNC: 80 MG/DL
LYMPHOCYTES # BLD AUTO: 2.09 K/UL
LYMPHOCYTES NFR BLD AUTO: 19.9 %
MAN DIFF?: NORMAL
MCHC RBC-ENTMCNC: 32.4 GM/DL
MCHC RBC-ENTMCNC: 32.5 PG
MCV RBC AUTO: 100.4 FL
MONOCYTES # BLD AUTO: 1.1 K/UL
MONOCYTES NFR BLD AUTO: 10.5 %
NEUTROPHILS # BLD AUTO: 6.56 K/UL
NEUTROPHILS NFR BLD AUTO: 62.3 %
NONHDLC SERPL-MCNC: 104 MG/DL
PLATELET # BLD AUTO: 261 K/UL
POTASSIUM SERPL-SCNC: 4.8 MMOL/L
PROT SERPL-MCNC: 6.9 G/DL
RBC # BLD: 4.61 M/UL
RBC # FLD: 14.4 %
SODIUM SERPL-SCNC: 139 MMOL/L
TRIGL SERPL-MCNC: 120 MG/DL
TSH SERPL-ACNC: 1.41 UIU/ML
WBC # FLD AUTO: 10.51 K/UL

## 2020-12-31 PROCEDURE — 0298T: CPT

## 2020-12-31 PROCEDURE — 99072 ADDL SUPL MATRL&STAF TM PHE: CPT

## 2021-06-10 ENCOUNTER — APPOINTMENT (OUTPATIENT)
Dept: CARDIOLOGY | Facility: CLINIC | Age: 76
End: 2021-06-10
Payer: MEDICARE

## 2021-06-10 ENCOUNTER — NON-APPOINTMENT (OUTPATIENT)
Age: 76
End: 2021-06-10

## 2021-06-10 VITALS
WEIGHT: 209 LBS | SYSTOLIC BLOOD PRESSURE: 117 MMHG | DIASTOLIC BLOOD PRESSURE: 70 MMHG | HEART RATE: 64 BPM | OXYGEN SATURATION: 95 % | HEIGHT: 67 IN | BODY MASS INDEX: 32.8 KG/M2

## 2021-06-10 PROCEDURE — 93000 ELECTROCARDIOGRAM COMPLETE: CPT

## 2021-06-10 PROCEDURE — 99214 OFFICE O/P EST MOD 30 MIN: CPT

## 2021-06-12 NOTE — DISCUSSION/SUMMARY
[___ Month(s)] : in [unfilled] month(s) [FreeTextEntry1] : The patient is a 76-year-old gentleman spinal stenosis, RLE weakness, hypertension, hyperlipidemia, CAD , PVC whose blood pressure has improved with weight loss\par #1 CV- on aspirin, no angina or HF, nl EF, continue toprol 25mg daily for PVC\par #2 Htn- decrease losartan 50/12.5mg\par #3 Lipids- continue atorvastatin\par #4 Neuro- RLE weakness, ambulates with cane/walker\par #5 Gout- conitnue allopurinol\par #6 General- dietary modifications reviewed, weight loss encouraged, working at [employer] part time. Received Pfizer vaccines.

## 2021-06-12 NOTE — HISTORY OF PRESENT ILLNESS
[FreeTextEntry1] : Toi has been trying to lose weight and exercise. No chest pain, palpitations or shortness of breath.

## 2021-06-12 NOTE — REVIEW OF SYSTEMS
[Weight Loss (___ Lbs)] : [unfilled] ~Ulb weight loss [SOB] : no shortness of breath [Dyspnea on exertion] : not dyspnea during exertion [Chest Discomfort] : no chest discomfort [Lower Ext Edema] : no extremity edema [Leg Claudication] : no intermittent leg claudication [Palpitations] : no palpitations [Syncope] : no syncope [Negative] : Heme/Lymph

## 2021-06-12 NOTE — PHYSICAL EXAM
[Well Developed] : well developed [Well Nourished] : well nourished [No Acute Distress] : no acute distress [Normal Conjunctiva] : normal conjunctiva [Normal Venous Pressure] : normal venous pressure [No Carotid Bruit] : no carotid bruit [Normal S1, S2] : normal S1, S2 [No Murmur] : no murmur [No Rub] : no rub [No Gallop] : no gallop [Clear Lung Fields] : clear lung fields [Good Air Entry] : good air entry [No Respiratory Distress] : no respiratory distress  [Non Tender] : non-tender [Soft] : abdomen soft [No Masses/organomegaly] : no masses/organomegaly [Normal Bowel Sounds] : normal bowel sounds [Normal Gait] : normal gait [No Edema] : no edema [No Cyanosis] : no cyanosis [No Varicosities] : no varicosities [No Clubbing] : no clubbing [No Rash] : no rash [No Skin Lesions] : no skin lesions [Moves all extremities] : moves all extremities [No Focal Deficits] : no focal deficits [Normal Speech] : normal speech [Normal memory] : normal memory [Alert and Oriented] : alert and oriented

## 2021-07-22 ENCOUNTER — APPOINTMENT (OUTPATIENT)
Dept: CARDIOLOGY | Facility: CLINIC | Age: 76
End: 2021-07-22
Payer: MEDICARE

## 2021-07-27 ENCOUNTER — APPOINTMENT (OUTPATIENT)
Dept: CARDIOLOGY | Facility: CLINIC | Age: 76
End: 2021-07-27
Payer: MEDICARE

## 2021-07-27 VITALS
DIASTOLIC BLOOD PRESSURE: 84 MMHG | OXYGEN SATURATION: 96 % | WEIGHT: 210 LBS | BODY MASS INDEX: 32.96 KG/M2 | HEART RATE: 62 BPM | HEIGHT: 67 IN | SYSTOLIC BLOOD PRESSURE: 149 MMHG

## 2021-07-27 VITALS — DIASTOLIC BLOOD PRESSURE: 80 MMHG | SYSTOLIC BLOOD PRESSURE: 124 MMHG

## 2021-07-27 PROCEDURE — 93000 ELECTROCARDIOGRAM COMPLETE: CPT

## 2021-07-27 PROCEDURE — 99214 OFFICE O/P EST MOD 30 MIN: CPT

## 2021-09-16 NOTE — DISCUSSION/SUMMARY
[___ Month(s)] : in [unfilled] month(s) [FreeTextEntry1] : The patient is a 76-year-old gentleman spinal stenosis, RLE weakness, hypertension, hyperlipidemia, CAD , PVC whose blood pressure is more stable.\par #1 CV- on aspirin, no angina or HF, nl EF, continue toprol 25mg daily for PVC\par #2 Htn- continue losartan 50/12.5mg\par #3 Lipids- continue atorvastatin\par #4 Neuro- RLE weakness, ambulates with cane/walker, f/u Dr. Tolbert\par #5 Gout- continue allopurinol\par #6 General- dietary modifications reviewed, weight loss encouraged, working at [employer] part time. Received Pfizer vaccines. \par 9/16/21 Addendum: There are no cardiac contraindications to colonoscopy.

## 2021-09-16 NOTE — REVIEW OF SYSTEMS
[Weight Loss (___ Lbs)] : [unfilled] ~Ulb weight loss [Negative] : Heme/Lymph [SOB] : no shortness of breath [Dyspnea on exertion] : not dyspnea during exertion [Chest Discomfort] : no chest discomfort [Lower Ext Edema] : no extremity edema [Leg Claudication] : no intermittent leg claudication [Palpitations] : no palpitations [Syncope] : no syncope

## 2021-09-16 NOTE — HISTORY OF PRESENT ILLNESS
[FreeTextEntry1] : Toi has been trying to lose weight and exercise. No chest pain, palpitations or shortness of breath. Right leg still weak. Had a puppy but could not walk because of leg and had to give him away.

## 2021-10-12 ENCOUNTER — APPOINTMENT (OUTPATIENT)
Dept: CARDIOLOGY | Facility: CLINIC | Age: 76
End: 2021-10-12

## 2021-11-09 ENCOUNTER — NON-APPOINTMENT (OUTPATIENT)
Age: 76
End: 2021-11-09

## 2021-11-09 ENCOUNTER — APPOINTMENT (OUTPATIENT)
Dept: CARDIOLOGY | Facility: CLINIC | Age: 76
End: 2021-11-09
Payer: MEDICARE

## 2021-11-09 VITALS
WEIGHT: 212 LBS | SYSTOLIC BLOOD PRESSURE: 126 MMHG | OXYGEN SATURATION: 95 % | BODY MASS INDEX: 33.27 KG/M2 | HEIGHT: 67 IN | HEART RATE: 66 BPM | DIASTOLIC BLOOD PRESSURE: 77 MMHG

## 2021-11-09 PROCEDURE — 99214 OFFICE O/P EST MOD 30 MIN: CPT

## 2021-11-09 PROCEDURE — 93000 ELECTROCARDIOGRAM COMPLETE: CPT

## 2021-11-10 NOTE — HISTORY OF PRESENT ILLNESS
[FreeTextEntry1] : Toi continues to work part time and takes days off when he feels tired. He applied for [employer]  position. No CP, palpitations or SOB>

## 2021-11-10 NOTE — DISCUSSION/SUMMARY
[___ Month(s)] : in [unfilled] month(s) [FreeTextEntry1] : The patient is a 76-year-old gentleman spinal stenosis, RLE weakness, hypertension, hyperlipidemia, CAD , PVC who is asymptomatic.\par #1 CV- on aspirin, no angina or HF, nl EF, continue toprol 25mg daily for PVC\par #2 Htn- continue losartan 50/12.5mg\par #3 Lipids- continue atorvastatin\par #4 Neuro- RLE weakness, ambulates with cane/walker, f/u Dr. Tolbert\par #5 Gout- continue allopurinol\par #6 General- dietary modifications reviewed, weight loss encouraged, working at [employer] part time and changing to [employer]. Received Pfizer vaccines. \par

## 2021-12-17 NOTE — DISCHARGE NOTE ADULT - VISION (WITH CORRECTIVE LENSES IF THE PATIENT USUALLY WEARS THEM):
Symptoms present for the past week. No associated chest pain, palpitations, or cough. pBNP 3963 (1358 one month ago), however patient does not appear volume overloaded on exam. CXR with chronic interstitial changes but no acute cardiopulmonary process. No respiratory distress with O2 100% on NC.     Elevated D-dimer (3.53) with Wells' DVT and PE scores <3. Elevated troponin (153) with no significant change on repeat and no evidence of ACS on EKG.     - bilateral lower extremity doppler   - supplemental O2 prn   - lipid panel   12/17  - BLE dopplers negative  - VQ lung scan      Partially impaired: cannot see medication labels or newsprint, but can see obstacles in path, and the surrounding layout; can count fingers at arm's length

## 2022-02-21 ENCOUNTER — RX RENEWAL (OUTPATIENT)
Age: 77
End: 2022-02-21

## 2022-04-28 ENCOUNTER — NON-APPOINTMENT (OUTPATIENT)
Age: 77
End: 2022-04-28

## 2022-04-28 ENCOUNTER — APPOINTMENT (OUTPATIENT)
Dept: CARDIOLOGY | Facility: CLINIC | Age: 77
End: 2022-04-28
Payer: MEDICARE

## 2022-04-28 VITALS — HEART RATE: 67 BPM | SYSTOLIC BLOOD PRESSURE: 123 MMHG | DIASTOLIC BLOOD PRESSURE: 78 MMHG

## 2022-04-28 PROCEDURE — 93000 ELECTROCARDIOGRAM COMPLETE: CPT

## 2022-04-28 PROCEDURE — 99214 OFFICE O/P EST MOD 30 MIN: CPT

## 2022-04-28 NOTE — PHYSICAL EXAM
[General Appearance - Well Developed] : well developed [Normal Appearance] : normal appearance [Well Groomed] : well groomed [General Appearance - Well Nourished] : well nourished [No Deformities] : no deformities [General Appearance - In No Acute Distress] : no acute distress [Normal Conjunctiva] : the conjunctiva exhibited no abnormalities [Eyelids - No Xanthelasma] : the eyelids demonstrated no xanthelasmas [Normal Oral Mucosa] : normal oral mucosa [No Oral Pallor] : no oral pallor [No Oral Cyanosis] : no oral cyanosis [Normal Jugular Venous A Waves Present] : normal jugular venous A waves present [Normal Jugular Venous V Waves Present] : normal jugular venous V waves present [No Jugular Venous Herman A Waves] : no jugular venous herman A waves [Respiration, Rhythm And Depth] : normal respiratory rhythm and effort [Exaggerated Use Of Accessory Muscles For Inspiration] : no accessory muscle use [Auscultation Breath Sounds / Voice Sounds] : lungs were clear to auscultation bilaterally [Heart Rate And Rhythm] : heart rate and rhythm were normal [Heart Sounds] : normal S1 and S2 [Murmurs] : no murmurs present [Abdomen Soft] : soft [Abdomen Tenderness] : non-tender [Abdomen Mass (___ Cm)] : no abdominal mass palpated [Abnormal Walk] : normal gait [Gait - Sufficient For Exercise Testing] : the gait was sufficient for exercise testing [Nail Clubbing] : no clubbing of the fingernails [Cyanosis, Localized] : no localized cyanosis [Petechial Hemorrhages (___cm)] : no petechial hemorrhages [Skin Color & Pigmentation] : normal skin color and pigmentation [] : no rash [No Venous Stasis] : no venous stasis [Skin Lesions] : no skin lesions [No Skin Ulcers] : no skin ulcer [No Xanthoma] : no  xanthoma was observed [Oriented To Time, Place, And Person] : oriented to person, place, and time [Affect] : the affect was normal [Mood] : the mood was normal [No Anxiety] : not feeling anxious

## 2022-04-28 NOTE — HISTORY OF PRESENT ILLNESS
[Preoperative Visit] : for a medical evaluation prior to surgery [Scheduled Procedure ___] : a [unfilled] [Date of Surgery ___] : on [unfilled] [Surgeon Name ___] : surgeon: [unfilled] [FreeTextEntry1] : Normal has been tired going back and forth to [place] to care for his sister who broke her vertebrae. No CP, palpitations or SOB

## 2022-04-28 NOTE — DISCUSSION/SUMMARY
[Procedure Low Risk] : the procedure risk is low [Patient Intermediate Risk] : the patient is an intermediate risk [Optimized for Surgery] : the patient is optimized for surgery [FreeTextEntry1] : The patient is a 77-year-old gentleman spinal stenosis, RLE weakness, hypertension, hyperlipidemia, CAD , PVC who is asymptomatic.\par #1 CV- on aspirin, no angina or HF, nl EF, continue toprol 25mg daily for PVC\par #2 Htn- continue losartan 50/12.5mg\par #3 Lipids- continue atorvastatin\par #4 Neuro- RLE weakness, ambulates with cane/walker, f/u Dr. Tolbert, on primidone and cymbalta\par #5 Gout- continue allopurinol\par #6 - Received Pfizer vaccines. On tamsulosin.\par There are no cardiac contraindications to proceeding with cystoscopy and polyp removal on aspirin. \par

## 2022-08-18 ENCOUNTER — APPOINTMENT (OUTPATIENT)
Dept: CARDIOLOGY | Facility: CLINIC | Age: 77
End: 2022-08-18

## 2022-08-18 ENCOUNTER — NON-APPOINTMENT (OUTPATIENT)
Age: 77
End: 2022-08-18

## 2022-08-18 VITALS
BODY MASS INDEX: 33.27 KG/M2 | SYSTOLIC BLOOD PRESSURE: 133 MMHG | HEIGHT: 67 IN | WEIGHT: 212 LBS | DIASTOLIC BLOOD PRESSURE: 89 MMHG | HEART RATE: 61 BPM | OXYGEN SATURATION: 96 %

## 2022-08-18 PROCEDURE — 99214 OFFICE O/P EST MOD 30 MIN: CPT

## 2022-08-18 PROCEDURE — 93000 ELECTROCARDIOGRAM COMPLETE: CPT

## 2022-08-18 NOTE — DISCUSSION/SUMMARY
[Procedure Intermediate Risk] : the procedure risk is intermediate [Patient Intermediate Risk] : the patient is an intermediate risk [Optimized for Surgery] : the patient is optimized for surgery [FreeTextEntry1] : The patient is a 77-year-old gentleman spinal stenosis, RLE weakness, hypertension, hyperlipidemia, CAD , PVC awaiting generator change\par #1 CV- on aspirin, no angina or HF, nl EF, continue toprol 25mg daily for PVC\par #2 Htn- continue losartan 50/12.5mg\par #3 Lipids- continue atorvastatin\par #4 Neuro- RLE weakness improved, f/u Dr. Tolbert, on primidone and cymbalta\par #5 Gout- continue allopurinol\par #6 - Received Pfizer vaccines. On tamsulosin.\par There are no cardiac contraindications to proceeding with neurostimulator change\par

## 2022-08-18 NOTE — HISTORY OF PRESENT ILLNESS
[Preoperative Visit] : for a medical evaluation prior to surgery [Scheduled Procedure ___] : a [unfilled] [Date of Surgery ___] : on [unfilled] [Surgeon Name ___] : surgeon: [unfilled] [FreeTextEntry1] : Normal has been feeling well. He is scheduled for generator change of neurostimulator. No CP, palpitations or SOB

## 2022-11-01 ENCOUNTER — NON-APPOINTMENT (OUTPATIENT)
Age: 77
End: 2022-11-01

## 2022-11-01 ENCOUNTER — APPOINTMENT (OUTPATIENT)
Dept: CARDIOLOGY | Facility: CLINIC | Age: 77
End: 2022-11-01

## 2022-11-01 VITALS
BODY MASS INDEX: 34.84 KG/M2 | OXYGEN SATURATION: 98 % | HEART RATE: 57 BPM | HEIGHT: 67 IN | DIASTOLIC BLOOD PRESSURE: 77 MMHG | WEIGHT: 222 LBS | SYSTOLIC BLOOD PRESSURE: 133 MMHG

## 2022-11-01 PROCEDURE — 93000 ELECTROCARDIOGRAM COMPLETE: CPT

## 2022-11-01 PROCEDURE — 99213 OFFICE O/P EST LOW 20 MIN: CPT

## 2022-11-01 RX ORDER — SULFAMETHOXAZOLE AND TRIMETHOPRIM 800; 160 MG/1; MG/1
800-160 TABLET ORAL
Qty: 20 | Refills: 0 | Status: DISCONTINUED | COMMUNITY
Start: 2022-08-10

## 2022-11-01 RX ORDER — OXYCODONE AND ACETAMINOPHEN 5; 325 MG/1; MG/1
5-325 TABLET ORAL
Qty: 20 | Refills: 0 | Status: DISCONTINUED | COMMUNITY
Start: 2022-08-23

## 2022-11-01 RX ORDER — CEPHALEXIN 500 MG/1
500 CAPSULE ORAL
Qty: 28 | Refills: 0 | Status: DISCONTINUED | COMMUNITY
Start: 2022-08-23

## 2022-11-01 RX ORDER — TRIAMCINOLONE ACETONIDE 1 MG/G
0.1 CREAM TOPICAL
Qty: 80 | Refills: 0 | Status: DISCONTINUED | COMMUNITY
Start: 2022-05-16

## 2022-11-01 RX ORDER — AMOXICILLIN AND CLAVULANATE POTASSIUM 500; 125 MG/1; MG/1
500-125 TABLET, FILM COATED ORAL
Qty: 20 | Refills: 0 | Status: DISCONTINUED | COMMUNITY
Start: 2022-08-10

## 2022-11-02 RX ORDER — ASPIRIN ENTERIC COATED TABLETS 81 MG 81 MG/1
81 TABLET, DELAYED RELEASE ORAL
Refills: 5 | Status: ACTIVE | COMMUNITY
Start: 2019-02-14

## 2022-11-02 RX ORDER — ATORVASTATIN CALCIUM 40 MG/1
40 TABLET, FILM COATED ORAL
Qty: 1 | Refills: 3 | Status: ACTIVE | COMMUNITY
Start: 2019-02-14

## 2022-11-02 NOTE — REVIEW OF SYSTEMS
[Weight Gain (___ Lbs)] : [unfilled] ~Ulb weight gain [Weight Loss (___ Lbs)] : no recent weight loss [SOB] : no shortness of breath [Dyspnea on exertion] : not dyspnea during exertion [Chest Discomfort] : no chest discomfort [Lower Ext Edema] : no extremity edema [Leg Claudication] : no intermittent leg claudication [Palpitations] : no palpitations [Syncope] : no syncope [Negative] : Heme/Lymph

## 2022-11-02 NOTE — HISTORY OF PRESENT ILLNESS
[FreeTextEntry1] : Toi has gained weight and frustrated. He is not exercising as he should. No CP, palpitations or SOB.

## 2022-11-02 NOTE — DISCUSSION/SUMMARY
[FreeTextEntry1] : The patient is a 77-year-old gentleman spinal stenosis, RLE weakness, hypertension, hyperlipidemia, CAD , PVC s/p generator change who has gained weight.\par #1 CV- c/w aspirin, no angina or HF, nl EF, c/w toprol 25mg daily for PVC\par #2 Htn- c/w losartan 50/12.5mg\par #3 Lipids- c/w atorvastatin\par #4 Neuro- RLE weakness improved, f/u Dr. Tolbert, on primidone and cymbalta\par #5 Gout- c/w allopurinol\par #6 - Received Pfizer vaccines. On tamsulosin.\par \par  [EKG obtained to assist in diagnosis and management of assessed problem(s)] : EKG obtained to assist in diagnosis and management of assessed problem(s)

## 2023-02-09 ENCOUNTER — APPOINTMENT (OUTPATIENT)
Dept: ELECTROPHYSIOLOGY | Facility: CLINIC | Age: 78
End: 2023-02-09
Payer: MEDICARE

## 2023-02-09 PROCEDURE — 93244 EXT ECG>48HR<7D REV&INTERPJ: CPT

## 2023-02-28 ENCOUNTER — APPOINTMENT (OUTPATIENT)
Dept: ELECTROPHYSIOLOGY | Facility: CLINIC | Age: 78
End: 2023-02-28
Payer: MEDICARE

## 2023-02-28 ENCOUNTER — NON-APPOINTMENT (OUTPATIENT)
Age: 78
End: 2023-02-28

## 2023-02-28 VITALS — DIASTOLIC BLOOD PRESSURE: 82 MMHG | OXYGEN SATURATION: 97 % | HEART RATE: 92 BPM | SYSTOLIC BLOOD PRESSURE: 159 MMHG

## 2023-02-28 PROCEDURE — 99205 OFFICE O/P NEW HI 60 MIN: CPT

## 2023-02-28 PROCEDURE — 99215 OFFICE O/P EST HI 40 MIN: CPT

## 2023-02-28 PROCEDURE — 93000 ELECTROCARDIOGRAM COMPLETE: CPT

## 2023-02-28 RX ORDER — MEXILETINE HYDROCHLORIDE 150 MG/1
150 CAPSULE ORAL
Qty: 60 | Refills: 5 | Status: DISCONTINUED | COMMUNITY
Start: 2023-02-10 | End: 2023-02-28

## 2023-02-28 RX ORDER — DULOXETINE HYDROCHLORIDE 60 MG/1
60 CAPSULE, DELAYED RELEASE ORAL
Refills: 0 | Status: DISCONTINUED | COMMUNITY
Start: 2019-02-14 | End: 2023-02-28

## 2023-02-28 NOTE — DISCUSSION/SUMMARY
[FreeTextEntry1] : 78 year old man with frequent ventricular ectopy.  The beats are suggestive of a site of origin from the LV summit.  While not overtly symptomatic we did discuss that the chronic headache times chronologically when the arrhythmia was noted and we discussed the possibility of this being related to effective bradycardia.  We also discussed that frequent VE may result in a tachycardia induced cardiomyopathy and that with his burden (> 28%) we should repeat an echo to assess his LV function. Certainly if there is any depression of LV function this warrant aggressive therapy, eg an attempt at ablation or anti arrhythmic medication.  We discussed an EP study/ablation procedure and the risks/benefits/alternatives of such a plan.  We will discuss further after the echo.

## 2023-02-28 NOTE — CARDIOLOGY SUMMARY
[de-identified] : today:\par 2/9/2023 ZANE SINDER [de-identified] : Abimael XT 2/9-2/12/2023\par VE burden of 28.9% [de-identified] : TTE 2/7/2019\par Normal LV internal dimensions and wall thicknesses\par Grossly normal LV systolic function\par Grossly normal RV systolic function\par  [de-identified] : 2/6/20019\par atherectomy of mid RCA and CHAITANYA

## 2023-02-28 NOTE — PHYSICAL EXAM
[Well Developed] : well developed [Well Nourished] : well nourished [No Acute Distress] : no acute distress [Normal Conjunctiva] : normal conjunctiva [Normal Venous Pressure] : normal venous pressure [No Carotid Bruit] : no carotid bruit [No Rub] : no rub [No Gallop] : no gallop [Clear Lung Fields] : clear lung fields [Good Air Entry] : good air entry [No Respiratory Distress] : no respiratory distress  [Soft] : abdomen soft [Non Tender] : non-tender [No Masses/organomegaly] : no masses/organomegaly [Normal Bowel Sounds] : normal bowel sounds [Normal Gait] : normal gait [No Edema] : no edema [No Cyanosis] : no cyanosis [No Clubbing] : no clubbing [No Varicosities] : no varicosities [No Rash] : no rash [No Skin Lesions] : no skin lesions [Moves all extremities] : moves all extremities [No Focal Deficits] : no focal deficits [Normal Speech] : normal speech [Alert and Oriented] : alert and oriented [Normal memory] : normal memory [de-identified] : 2/6 systolic murmur [de-identified] : premature beats

## 2023-02-28 NOTE — HISTORY OF PRESENT ILLNESS
[FreeTextEntry1] : 78 year old man with a history of hypertension, CAD (s/p stent) presents for an evaluation of frequent PVCs.  He received an [employer] watch as a gift for [employer].  The watch has identified him as having "slow heart rates" and he presented for evaluation  He does not have overt symptoms, but he admits that he has been getting headaches.  He does not get lightheaded or dizzy. He has a perpetual headache.  He has been taking Tylenol.  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  He does try to exercise, but he has to be careful.  He has not been exercising since the headaches have come. He does admit to intermittent palpitations and sometimes feels the beats.  No identifiable triggers.  metoprolol was lowered to once daily without change in symptoms.

## 2023-02-28 NOTE — REVIEW OF SYSTEMS
[Tremor] : a tremor was seen [Negative] : Heme/Lymph [FreeTextEntry9] : back pain [de-identified] : headache

## 2023-03-16 ENCOUNTER — APPOINTMENT (OUTPATIENT)
Dept: CARDIOLOGY | Facility: CLINIC | Age: 78
End: 2023-03-16
Payer: MEDICARE

## 2023-03-16 PROCEDURE — 93306 TTE W/DOPPLER COMPLETE: CPT

## 2023-03-27 NOTE — PATIENT PROFILE ADULT - NSPROEDAABILITYLEARN_GEN_A_NUR
General Surgery Consultation    Ashley Lorenzo MRN# [id number]   Age: 35 year old YOB: 1987     Date of Admission:  3/26/2023    Reason for consult:            Abdominal pain, right upper quadrant       Requesting provider:            Hali Gregory PA-C                Assessment and Plan:   Assessment:   Ashley Lorenzo is a 35 year old female with cholelithiasis and hyperbilirubinemia concerning for possible choledocholithiasis.     Comorbidities:   has a past medical history of Anxiety, Chickenpox, and Mild postpartum depression (2011).      Plan:   Discussion had with [facility], due to resolution of pain and plateau of bilirubin level will plan for MRCP this morning. If stone shown in CBD will then need to have EUS/ERCP with interval cholecystectomy. If CBD open will plan for cholecystectomy today/tomorrow pending availability.  We discussed laparoscopic/robotic cholecystectomy  We have discussed the indication, alternatives, risks and expected recovery.  Specifically we have discussed incisions, scarring, postoperative infections, anesthesia, bleeding, blood transfusion, open conversion, common bile duct injury, injury to intra-abdominal organs, adhesions leading to bowel obstruction, retained common bile duct stone, bile leak, DVT, PE, hernia, post cholecystectomy diarrhea, recovery, postoperative dietary restrictions and physical limitations.  We have discussed the recommended interventions and treatments for these complications.  All questions have been answered to the best of my ability.                   Chief Complaint:   Abdominal pain, right upper quadrant     History is obtained from the patient.         History of Present Illness:   Ashley Lorenzo is a 35 year old female who presents with epigastric region right upper quadrant abdominal pain for the past several month.  The pain is intermittent.  She has had similar pain in the past.  There is an association with eating fatty foods.  Associated  nausea and bloating. At this point she states her admission pain and nausea seem resolving.             Past Medical History:     Past Medical History:   Diagnosis Date     Anxiety     no meds needed     Chickenpox      Mild postpartum depression 2011             Past Surgical History:     Past Surgical History:   Procedure Laterality Date     H STATISTIC AMNIOINFUSION  1/1/2012          MOUTH SURGERY      wisdom teeth             Social History:     Social History     Tobacco Use     Smoking status: Never     Smokeless tobacco: Never   Substance Use Topics     Alcohol use: Yes     Comment: rare             Family History:     Family History   Problem Relation Age of Onset     Cancer Maternal Grandmother         skin     Blood Disease Maternal Grandmother         anemia     Thyroid Disease Maternal Grandmother      Cardiovascular Maternal Grandmother         MI- pacemaker     Alzheimer Disease Maternal Grandmother      Diabetes Father      Lipids Father      Hypertension Father      Diabetes Paternal Grandmother      Diabetes Paternal Grandfather      Hypertension Paternal Grandfather      Lipids Paternal Grandfather      Cancer Mother         anemia     Dementia Maternal Grandfather      No family history of bleeding or clotting disorders.         Allergies:     Allergies   Allergen Reactions     Cats      Dogs      Dust Mites      Penicillins      Pollen Extract/Tree Extract              Medications:   No current facility-administered medications on file prior to encounter.  acetaminophen (TYLENOL) 500 MG tablet, Take 500 mg by mouth daily as needed for mild pain (migraines)  busPIRone (BUSPAR) 10 MG tablet, Take 10 mg by mouth 3 times daily as needed for anxiety  ibuprofen (ADVIL,MOTRIN) 600 MG tablet, Take 1 tablet (600 mg) by mouth every 6 hours as needed for moderate pain  UNABLE TO FIND, MEDICATION NAME: Rehana back and body (aspirin and caffeine)- 1 tablet daily prn for migraines        cefTRIAXone  2 g  "Intravenous Q24H     famotidine  20 mg Oral BID     metroNIDAZOLE  500 mg Intravenous Q8H     sodium chloride (PF)  3 mL Intracatheter Q8H            Review of Systems:   The 10 point review of systems is negative other than noted in the HPI.          Physical Exam:   /84 (BP Location: Left arm)   Pulse 61   Temp 98.2  F (36.8  C) (Oral)   Resp 16   Ht 1.588 m (5' 2.5\")   Wt 86.9 kg (191 lb 8 oz)   LMP 03/26/2023 (Exact Date)   SpO2 96%   BMI 34.47 kg/m    General - Well developed, well nourished female in no apparent distress  HEENT:  Head normocephalic and atraumatic, pupils equal and round, conjunctivae clear, no scleral icterus, mucous membranes moist, external ears and nose normal  Neck: Supple without thyromegaly or masses  Lymphatic: No cervical, or supraclavicular lymphadenopathy  Lungs: non-labored breathing  Heart: regular rate and rhythm, no murmurs  Abdomen: soft, flat, non-distended with mild tenderness noted in the epigastric region and in the right upper quadrant . no masses palpated  Extremities: Warm without edema  Neurologic: nonfocal  Psychiatric: Mood and affect appropriate  Skin: Without lesions, rashes, or jaundice         Data:     WBC -   Lab Results   Component Value Date    WBC 5.8 03/27/2023       HgB -   Lab Results   Component Value Date    HGB 11.0 (L) 03/27/2023       Plt-   Lab Results   Component Value Date     03/27/2023       Liver Function Studies -   Recent Labs   Lab Test 03/27/23  0637   PROTTOTAL 5.8*   ALBUMIN 3.4*   BILITOTAL 2.1*   ALKPHOS 285*   *   *       Lipase-   Lab Results   Component Value Date    LIPASE >3,000 (H) 03/26/2023         Imaging:  All imaging studies reviewed by me.    Results for orders placed or performed during the hospital encounter of 03/26/23   US Abdomen Limited    Narrative    EXAM: US ABDOMEN LIMITED  LOCATION: [facility]  DATE/TIME: 3/26/2023 6:11 PM    INDICATION: Right upper quadrant " abdominal pain.  COMPARISON: None.  TECHNIQUE: Limited abdominal ultrasound.    FINDINGS:    GALLBLADDER: Gallstones in an otherwise normal gallbladder. No wall thickening, or pericholecystic fluid. Negative sonographic Gagnon's sign.    BILE DUCTS: No biliary dilatation. The common duct measures 3 mm.    LIVER: Normal parenchyma with smooth contour. No focal mass.    RIGHT KIDNEY: No hydronephrosis.    PANCREAS: Head and body portions normal, tail obscured by bowel gas.    No ascites.      Impression    IMPRESSION:  1.  Cholelithiasis in an otherwise normal appearing gallbladder.  2.  Right upper quadrant ultrasound otherwise negative.             Time spent with the patient, reviewing the EMR, reviewing laboratory and imaging studies, more than 50% of which was counseling and coordinating care:  46 minutes.     Roby Wellington MD        none

## 2023-05-02 ENCOUNTER — APPOINTMENT (OUTPATIENT)
Dept: CARDIOLOGY | Facility: CLINIC | Age: 78
End: 2023-05-02
Payer: MEDICARE

## 2023-05-09 ENCOUNTER — OUTPATIENT (OUTPATIENT)
Dept: OUTPATIENT SERVICES | Facility: HOSPITAL | Age: 78
LOS: 1 days | End: 2023-05-09
Payer: MEDICARE

## 2023-05-09 VITALS
WEIGHT: 190.92 LBS | DIASTOLIC BLOOD PRESSURE: 80 MMHG | HEART RATE: 64 BPM | OXYGEN SATURATION: 97 % | SYSTOLIC BLOOD PRESSURE: 118 MMHG | HEIGHT: 67 IN | TEMPERATURE: 98 F | RESPIRATION RATE: 16 BRPM

## 2023-05-09 DIAGNOSIS — Z98.89 OTHER SPECIFIED POSTPROCEDURAL STATES: Chronic | ICD-10-CM

## 2023-05-09 DIAGNOSIS — I10 ESSENTIAL (PRIMARY) HYPERTENSION: ICD-10-CM

## 2023-05-09 DIAGNOSIS — Z01.818 ENCOUNTER FOR OTHER PREPROCEDURAL EXAMINATION: ICD-10-CM

## 2023-05-09 DIAGNOSIS — I49.3 VENTRICULAR PREMATURE DEPOLARIZATION: ICD-10-CM

## 2023-05-09 DIAGNOSIS — Z96.82 PRESENCE OF NEUROSTIMULATOR: Chronic | ICD-10-CM

## 2023-05-09 DIAGNOSIS — G25.0 ESSENTIAL TREMOR: ICD-10-CM

## 2023-05-09 DIAGNOSIS — Z96.82 PRESENCE OF NEUROSTIMULATOR: ICD-10-CM

## 2023-05-09 DIAGNOSIS — C76.2 MALIGNANT NEOPLASM OF ABDOMEN: Chronic | ICD-10-CM

## 2023-05-09 DIAGNOSIS — Z98.890 OTHER SPECIFIED POSTPROCEDURAL STATES: Chronic | ICD-10-CM

## 2023-05-09 LAB
ANION GAP SERPL CALC-SCNC: 11 MMOL/L — SIGNIFICANT CHANGE UP (ref 5–17)
BLD GP AB SCN SERPL QL: NEGATIVE — SIGNIFICANT CHANGE UP
BUN SERPL-MCNC: 28 MG/DL — HIGH (ref 7–23)
CALCIUM SERPL-MCNC: 10.1 MG/DL — SIGNIFICANT CHANGE UP (ref 8.4–10.5)
CHLORIDE SERPL-SCNC: 101 MMOL/L — SIGNIFICANT CHANGE UP (ref 96–108)
CO2 SERPL-SCNC: 26 MMOL/L — SIGNIFICANT CHANGE UP (ref 22–31)
CREAT SERPL-MCNC: 1.28 MG/DL — SIGNIFICANT CHANGE UP (ref 0.5–1.3)
EGFR: 58 ML/MIN/1.73M2 — LOW
GLUCOSE SERPL-MCNC: 102 MG/DL — HIGH (ref 70–99)
HCT VFR BLD CALC: 43 % — SIGNIFICANT CHANGE UP (ref 39–50)
HGB BLD-MCNC: 14.1 G/DL — SIGNIFICANT CHANGE UP (ref 13–17)
MCHC RBC-ENTMCNC: 32.8 GM/DL — SIGNIFICANT CHANGE UP (ref 32–36)
MCHC RBC-ENTMCNC: 33.3 PG — SIGNIFICANT CHANGE UP (ref 27–34)
MCV RBC AUTO: 101.7 FL — HIGH (ref 80–100)
NRBC # BLD: 0 /100 WBCS — SIGNIFICANT CHANGE UP (ref 0–0)
PLATELET # BLD AUTO: 220 K/UL — SIGNIFICANT CHANGE UP (ref 150–400)
POTASSIUM SERPL-MCNC: 4.5 MMOL/L — SIGNIFICANT CHANGE UP (ref 3.5–5.3)
POTASSIUM SERPL-SCNC: 4.5 MMOL/L — SIGNIFICANT CHANGE UP (ref 3.5–5.3)
RBC # BLD: 4.23 M/UL — SIGNIFICANT CHANGE UP (ref 4.2–5.8)
RBC # FLD: 14.7 % — HIGH (ref 10.3–14.5)
RH IG SCN BLD-IMP: POSITIVE — SIGNIFICANT CHANGE UP
SODIUM SERPL-SCNC: 138 MMOL/L — SIGNIFICANT CHANGE UP (ref 135–145)
WBC # BLD: 9.95 K/UL — SIGNIFICANT CHANGE UP (ref 3.8–10.5)
WBC # FLD AUTO: 9.95 K/UL — SIGNIFICANT CHANGE UP (ref 3.8–10.5)

## 2023-05-09 PROCEDURE — 80048 BASIC METABOLIC PNL TOTAL CA: CPT

## 2023-05-09 PROCEDURE — 86850 RBC ANTIBODY SCREEN: CPT

## 2023-05-09 PROCEDURE — 86900 BLOOD TYPING SEROLOGIC ABO: CPT

## 2023-05-09 PROCEDURE — G0463: CPT

## 2023-05-09 PROCEDURE — 85027 COMPLETE CBC AUTOMATED: CPT

## 2023-05-09 PROCEDURE — 86901 BLOOD TYPING SEROLOGIC RH(D): CPT

## 2023-05-09 RX ORDER — TAMSULOSIN HYDROCHLORIDE 0.4 MG/1
2 CAPSULE ORAL
Refills: 0 | DISCHARGE

## 2023-05-09 RX ORDER — GABAPENTIN 400 MG/1
2 CAPSULE ORAL
Qty: 0 | Refills: 0 | DISCHARGE

## 2023-05-09 RX ORDER — ACETAMINOPHEN 500 MG
2 TABLET ORAL
Refills: 0 | DISCHARGE

## 2023-05-09 RX ORDER — METOPROLOL TARTRATE 50 MG
1 TABLET ORAL
Refills: 0 | DISCHARGE

## 2023-05-09 RX ORDER — ASPIRIN/CALCIUM CARB/MAGNESIUM 324 MG
1 TABLET ORAL
Refills: 0 | DISCHARGE

## 2023-05-09 RX ORDER — ATORVASTATIN CALCIUM 80 MG/1
1 TABLET, FILM COATED ORAL
Refills: 0 | DISCHARGE

## 2023-05-09 RX ORDER — FEXOFENADINE HCL 30 MG
1 TABLET ORAL
Refills: 0 | DISCHARGE

## 2023-05-09 RX ORDER — PRIMIDONE 250 MG/1
1 TABLET ORAL
Refills: 0 | DISCHARGE

## 2023-05-09 RX ORDER — PRIMIDONE 250 MG/1
1 TABLET ORAL
Qty: 0 | Refills: 0 | DISCHARGE

## 2023-05-09 RX ORDER — LOSARTAN/HYDROCHLOROTHIAZIDE 100MG-25MG
1 TABLET ORAL
Refills: 0 | DISCHARGE

## 2023-05-09 NOTE — H&P PST ADULT - HISTORY OF PRESENT ILLNESS
77 yo male, PMH spinal stenosis with RLE weakness and foot drop, HTN, HLD, CAD s/p stent, "slow heart rate" discovered by Apple watch, followed up with cardiology - pt. has frequent headaches that started, slow heart    The patient is a 78-year-old gentleman spinal stenosis, RLE weakness, HTN, HLD, CAD , PVC s/p generator change who has gained weight.  78 year old man with a history of hypertension, CAD (s/p stent) presents for an evaluation of frequent PVCs. He received an Apple watch as a gift for [employer]. The watch has identified him as having "slow heart rates" and he presented for evaluation He does not have overt symptoms, but he admits that he has been getting headaches. He does not get lightheaded or dizzy. He has a perpetual headache. He has been taking Tylenol. 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 79 yo male, PMH multiple spine surgeries, including ACDF, Lumbar fusion, placement of neurostimulator 2007, replaced 7/2022, essential tremors, has right foot foot drop and tends to fall easily, JOSSY (does not use CPAP) HTN, HLD, CAD s/p CHAITANYA to mid RCA 2/2019, "slow heart rate" discovered by Apple watch, followed up with cardiology - EKG revealed frequent PCV's - pt. has noticed daily headaches, denies lightheadedness syncopal episodes - pt. presents to PST for scheduled PVC ablation on 6/5/23. Denies recent fever, chills, chest pain, SOB, changes in bowel/urinary habits or recent exposure to COVID-19 infection. Pt. advised to bring neurostimulator remote control DOS - verbalized understanding.

## 2023-05-09 NOTE — H&P PST ADULT - NSICDXPASTSURGICALHX_GEN_ALL_CORE_FT
PAST SURGICAL HISTORY:  Cervical disc disorder anterior cervical diskectomy C6-C7 2001 2005 posterior cervical cecompression and foraminectomy fusion and plating  8/2005 anterior cervical diskectomy and removal of plate and replated with fustion   2007 cervical laminectomy    H/O Spinal Surgery 2007 Placement of battery pack for neurostimulator    History of laminectomy 2007 Cervical laminectomy for neurostimulator    History of lumbar laminectomy 2012 L4-L5    Hx of LASIK 6 years ago    Malignant melanoma of abdomen 2014 - s/p excision    Neuropathy of hand s/p insertion of neurostimulator in 2007 secondary to neuropathy in the RIGHT wrist/forearm     PAST SURGICAL HISTORY:  Cervical disc disorder anterior cervical diskectomy C6-C7 2001 2005 posterior cervical cecompression and foraminectomy fusion and plating  8/2005 anterior cervical diskectomy and removal of plate and replated with fustion   2007 cervical laminectomy    H/O Spinal Surgery 2007 Placement of battery pack for neurostimulator    History of laminectomy 2007 Cervical laminectomy for neurostimulator    History of lumbar laminectomy 2012 L4-L5    Hx of LASIK 6 years ago    Malignant melanoma of abdomen 2014 - s/p excision    Neuropathy of hand s/p insertion of neurostimulator in 2007 secondary to neuropathy in the RIGHT wrist/forearm    Neurostimulator device in situ     S/P cardiac cath

## 2023-05-09 NOTE — H&P PST ADULT - NEUROLOGICAL COMMENTS
paresthesias/loss of sensation, tremors on  bilateral hands hands tremors appreciated paresthesias on RLE, right foot drop, tremors on  bilateral hands

## 2023-05-09 NOTE — H&P PST ADULT - NSICDXPASTMEDICALHX_GEN_ALL_CORE_FT
PAST MEDICAL HISTORY:  BPH (benign prostatic hypertrophy)     Cervical arthritis     Deviated septum     Drug dependence Chronic pain - opioid dependence, has completed suboxone therapy  - no opioids for last 5 months    Familial tremor     Gout     HTN (hypertension)     Hyperlipidemia     Nasal polyps     Obesity (BMI 30-39.9)     JOSSY (obstructive sleep apnea) noncompliant with cpap    Spinal stenosis of lumbar region     Spondylolisthesis     Testosterone deficiency     Ventral hernia      PAST MEDICAL HISTORY:  BPH (benign prostatic hypertrophy)     CAD (coronary artery disease)     Cervical arthritis     Deviated septum     Drug dependence Chronic pain - opioid dependence, has completed suboxone therapy  - no opioids for last 5 months    Familial tremor     Frequent PVCs     Gout     H/O sinus bradycardia     HTN (hypertension)     Hyperlipidemia     Nasal polyps     Obesity (BMI 30-39.9)     JOSSY (obstructive sleep apnea) noncompliant with cpap    Spinal stenosis of lumbar region     Spondylolisthesis     Testosterone deficiency     Ventral hernia

## 2023-05-09 NOTE — H&P PST ADULT - NS MD HP INPLANTS MED DEV
cervical spine plates, lumbar spinal implant, lumbar hardware cervical and lumbar hardware, neurostimulator

## 2023-05-09 NOTE — H&P PST ADULT - ASSESSMENT
Activity: Can walk 4 blocks, one flights of stairs    DASI scale:     Mallampati:    Dentition: Denies loose teeth/dentures Activity: Can walk 4 blocks, one flights of stairs    DASI scale: 5.07     Mallampati: 2    Dentition: Denies loose teeth/dentures

## 2023-05-09 NOTE — H&P PST ADULT - ENT GEN HX ROS MEA POS PC
from environmental allergies/sinus symptoms/nasal congestion from environmental allergies/hearing difficulty/sinus symptoms/nasal congestion

## 2023-05-09 NOTE — H&P PST ADULT - PROBLEM SELECTOR PLAN 2
Continue metoprolol as indicated, including am of surgery with sip of water  Hold Losartan/Hctz DOS due to diuretic effect

## 2023-05-26 ENCOUNTER — NON-APPOINTMENT (OUTPATIENT)
Age: 78
End: 2023-05-26

## 2023-06-05 ENCOUNTER — TRANSCRIPTION ENCOUNTER (OUTPATIENT)
Age: 78
End: 2023-06-05

## 2023-06-05 ENCOUNTER — OUTPATIENT (OUTPATIENT)
Dept: INPATIENT UNIT | Facility: HOSPITAL | Age: 78
LOS: 1 days | End: 2023-06-05
Payer: MEDICARE

## 2023-06-05 VITALS
TEMPERATURE: 98 F | RESPIRATION RATE: 16 BRPM | DIASTOLIC BLOOD PRESSURE: 87 MMHG | OXYGEN SATURATION: 96 % | SYSTOLIC BLOOD PRESSURE: 142 MMHG | HEIGHT: 67 IN | WEIGHT: 192.9 LBS

## 2023-06-05 DIAGNOSIS — Z98.890 OTHER SPECIFIED POSTPROCEDURAL STATES: Chronic | ICD-10-CM

## 2023-06-05 DIAGNOSIS — I49.3 VENTRICULAR PREMATURE DEPOLARIZATION: ICD-10-CM

## 2023-06-05 DIAGNOSIS — Z98.89 OTHER SPECIFIED POSTPROCEDURAL STATES: Chronic | ICD-10-CM

## 2023-06-05 DIAGNOSIS — Z96.82 PRESENCE OF NEUROSTIMULATOR: Chronic | ICD-10-CM

## 2023-06-05 DIAGNOSIS — C76.2 MALIGNANT NEOPLASM OF ABDOMEN: Chronic | ICD-10-CM

## 2023-06-05 PROCEDURE — 93010 ELECTROCARDIOGRAM REPORT: CPT | Mod: 76

## 2023-06-05 RX ORDER — METOPROLOL TARTRATE 50 MG
25 TABLET ORAL
Refills: 0 | Status: DISCONTINUED | OUTPATIENT
Start: 2023-06-05 | End: 2023-06-19

## 2023-06-05 RX ORDER — ATORVASTATIN CALCIUM 80 MG/1
40 TABLET, FILM COATED ORAL AT BEDTIME
Refills: 0 | Status: DISCONTINUED | OUTPATIENT
Start: 2023-06-05 | End: 2023-06-19

## 2023-06-05 RX ORDER — ASPIRIN/CALCIUM CARB/MAGNESIUM 324 MG
81 TABLET ORAL DAILY
Refills: 0 | Status: DISCONTINUED | OUTPATIENT
Start: 2023-06-05 | End: 2023-06-19

## 2023-06-05 RX ORDER — LOSARTAN POTASSIUM 100 MG/1
50 TABLET, FILM COATED ORAL DAILY
Refills: 0 | Status: DISCONTINUED | OUTPATIENT
Start: 2023-06-05 | End: 2023-06-19

## 2023-06-05 RX ORDER — ATORVASTATIN CALCIUM 80 MG/1
1 TABLET, FILM COATED ORAL
Refills: 0 | DISCHARGE

## 2023-06-05 RX ORDER — PRIMIDONE 250 MG/1
250 TABLET ORAL THREE TIMES A DAY
Refills: 0 | Status: DISCONTINUED | OUTPATIENT
Start: 2023-06-05 | End: 2023-06-19

## 2023-06-05 RX ORDER — TAMSULOSIN HYDROCHLORIDE 0.4 MG/1
0.8 CAPSULE ORAL AT BEDTIME
Refills: 0 | Status: DISCONTINUED | OUTPATIENT
Start: 2023-06-05 | End: 2023-06-19

## 2023-06-05 RX ADMIN — PRIMIDONE 250 MILLIGRAM(S): 250 TABLET ORAL at 21:12

## 2023-06-05 RX ADMIN — ATORVASTATIN CALCIUM 40 MILLIGRAM(S): 80 TABLET, FILM COATED ORAL at 21:12

## 2023-06-05 RX ADMIN — Medication 25 MILLIGRAM(S): at 18:48

## 2023-06-05 RX ADMIN — TAMSULOSIN HYDROCHLORIDE 0.8 MILLIGRAM(S): 0.4 CAPSULE ORAL at 21:12

## 2023-06-05 NOTE — DISCHARGE NOTE PROVIDER - NSDCCPCAREPLAN_GEN_ALL_CORE_FT
PRINCIPAL DISCHARGE DIAGNOSIS  Diagnosis: PVC's (premature ventricular contractions)  Assessment and Plan of Treatment: Your heartbeat will be controlled. Your catheter/groin site will be free of infection and severe bleeding.  Your heart rate and rhythm will be controlled.   Continue with your cardiologist and primary care MD. Continue your current medications. Call your physician for palpitations, feelings of rapid heart beat, lightheadedness, or dizziness.      SECONDARY DISCHARGE DIAGNOSES  Diagnosis: HLD (hyperlipidemia)  Assessment and Plan of Treatment: Your LDL cholesterol will be less than 70mg/dL  Continue with your cholesterol medications. Eat a heart healthy diet that is low in saturated fats and salt, and includes whole grains, fruits, vegetables and lean protein; exercise regularly (consult with your physician or cardiologist first); maintain a heart healthy weight; if you smoke - quit (A resource to help you stop smoking is the [facility] – phone number [phone].). Continue to follow with your primary physician or cardiologist.

## 2023-06-05 NOTE — DISCHARGE NOTE PROVIDER - NSDCCPTREATMENT_GEN_ALL_CORE_FT
PRINCIPAL PROCEDURE  Procedure: Cardiac ablation  Findings and Treatment: 6/5 s/p PVC ablation via B/L groin sites

## 2023-06-05 NOTE — DISCHARGE NOTE PROVIDER - NSDCFUSCHEDAPPT_GEN_ALL_CORE_FT
Carolina Gaytan  [facility]  CARDIOLOGY [address]  Scheduled Appointment: 06/20/2023    John Posada  [facility]  ELECTROPH [address]  Scheduled Appointment: 07/18/2023

## 2023-06-05 NOTE — DISCHARGE NOTE PROVIDER - CARE PROVIDER_API CALL
John Posada.  Cardiac Electrophysiology  [address] [place] [id number]  Phone: [phone]  Fax: [phone]  Scheduled Appointment: 07/18/2023 02:30 PM

## 2023-06-05 NOTE — DISCHARGE NOTE PROVIDER - NSDCMRMEDTOKEN_GEN_ALL_CORE_FT
Allegra 180 mg oral tablet: 1 tab(s) orally once a day  aspirin 81 mg oral tablet: 1 tab(s) orally once a day  Flomax 0.4 mg oral capsule: 2 cap(s) orally once a day  Lipitor 40 mg oral tablet: 1 tab(s) orally once a day  losartan-hydrochlorothiazide 50 mg-12.5 mg oral tablet: 1 tab(s) orally once a day  metoprolol succinate 25 mg oral capsule, extended release: 1 tab(s) orally 2 times a day  primidone 250 mg oral tablet: 1 tab(s) orally 3 times a day  Tylenol 500 mg oral tablet: 2 tab(s) orally once a day as needed for  mild pain

## 2023-06-05 NOTE — DISCHARGE NOTE PROVIDER - HOSPITAL COURSE
HPI: 79 yo male, PMH multiple spine surgeries, including ACDF, Lumbar fusion, placement of neurostimulator 2007, replaced 7/2022, essential tremors, has right foot foot drop and tends to fall easily, JOSSY (does not use CPAP) HTN, HLD, CAD s/p CHAITANYA to mid RCA 2/2019, "slow heart rate" discovered by Apple watch, followed up with cardiology - EKG revealed frequent PCV's - pt. has noticed daily headaches, denies lightheadedness syncopal episodes - pt. presents to PST for scheduled PVC ablation on 6/5/23. Denies recent fever, chills, chest pain, SOB, changes in bowel/urinary habits or recent exposure to COVID-19 infection. Pt. advised to bring neurostimulator remote control DOS - verbalized understanding.    6/5 s/p PVC ablation via B/L groin sites with manual pressure held. Anticipate discharge in AM HPI: 79 yo male, PMH multiple spine surgeries, including ACDF, Lumbar fusion, placement of neurostimulator 2007, replaced 7/2022, essential tremors, has right foot foot drop and tends to fall easily, JOSSY (does not use CPAP) HTN, HLD, CAD s/p CHAITANYA to mid RCA 2/2019, "slow heart rate" discovered by Apple watch, followed up with cardiology - EKG revealed frequent PCV's - pt. has noticed daily headaches, denies lightheadedness syncopal episodes - pt. presents to PST for scheduled PVC ablation on 6/5/23. Denies recent fever, chills, chest pain, SOB, changes in bowel/urinary habits or recent exposure to COVID-19 infection. Pt. advised to bring neurostimulator remote control DOS - verbalized understanding.    6/5 s/p PVC ablation via B/L groin sites with manual pressure held.   6/5 stable for discharge to home today as per DR Posada .  Bilateral groins soft to touch and non tender. DP pulse bilaterally 2+/2+.

## 2023-06-05 NOTE — DISCHARGE NOTE PROVIDER - NSDCFUADDINST_GEN_ALL_CORE_FT
WOUND CARE:  The day AFTER your procedure  - Remove the bandage at the site GENTLY, clean with mild soap and water, and pat dry; leave open to air  - You may shower   - DO NOT apply lotions, creams, ointments, powder, perfumes to your incision site  -Check your groin every day. A small amount of bruising or soreness is normal, a bump ( smaller than nickel) might be present, normal  - DO NOT SOAK your site for 1 week ( no baths, no pools, no tubs, etc..)    ACTIVITY:  Your procedure was done through your groin  for the next 5 DAYS:  - Limit climbing stairs, no strenous activity, pushing , pulling, or straining   DO NOT LIFT anything 10 lbs or heavier   you may resume sexual activity in 7 days, unless instructed otherwise  mild palpitations are normal     Follow heart healthy diet reccomended by your doctor, if you smoke STOP SMOKING ( may call [phone] for center of tobacco control if you need assistance).  for the next 24 hours:   - stay at home and rest, do not drive or operate heavy machinery  do not drink alcoholic beverages   do not make important personal or business decisions     ***CALL YOUR DOCTOR ***  IF you have fever, chills, body aches, or severe pain, swelling, redness, heat, yellow drainage from your incision site  IF bleeding  or significant new swelling from your puncture site  IF you experience rapid heartbeat or palpitations that cause: lightheadedness, dizziness, or fainting spell.  If you experience difficulty swallowing, or pain with swallowing   IF unable to get in contact with your doctor, you may call the Cardiology Office at [facility] at [phone]

## 2023-06-05 NOTE — PRE-ANESTHESIA EVALUATION ADULT - NSANTHPMHFT_GEN_ALL_CORE
77yo man with chronic back pain, s/p ACDF, lumbar fusion, neurostimulator, former opioid dependence weaned off with suboxone 5yrs ago, essential tremors, HTN, HLD, CAD s/p CHAITANYA, JOSSY not on CPAP

## 2023-06-06 ENCOUNTER — TRANSCRIPTION ENCOUNTER (OUTPATIENT)
Age: 78
End: 2023-06-06

## 2023-06-06 VITALS
OXYGEN SATURATION: 96 % | RESPIRATION RATE: 15 BRPM | TEMPERATURE: 99 F | HEART RATE: 78 BPM | SYSTOLIC BLOOD PRESSURE: 121 MMHG | DIASTOLIC BLOOD PRESSURE: 83 MMHG

## 2023-06-06 LAB
ANION GAP SERPL CALC-SCNC: 10 MMOL/L — SIGNIFICANT CHANGE UP (ref 5–17)
BUN SERPL-MCNC: 25 MG/DL — HIGH (ref 7–23)
CALCIUM SERPL-MCNC: 9.5 MG/DL — SIGNIFICANT CHANGE UP (ref 8.4–10.5)
CHLORIDE SERPL-SCNC: 104 MMOL/L — SIGNIFICANT CHANGE UP (ref 96–108)
CO2 SERPL-SCNC: 26 MMOL/L — SIGNIFICANT CHANGE UP (ref 22–31)
CREAT SERPL-MCNC: 1.33 MG/DL — HIGH (ref 0.5–1.3)
EGFR: 55 ML/MIN/1.73M2 — LOW
GLUCOSE SERPL-MCNC: 136 MG/DL — HIGH (ref 70–99)
HCT VFR BLD CALC: 39 % — SIGNIFICANT CHANGE UP (ref 39–50)
HGB BLD-MCNC: 13 G/DL — SIGNIFICANT CHANGE UP (ref 13–17)
MCHC RBC-ENTMCNC: 33.2 PG — SIGNIFICANT CHANGE UP (ref 27–34)
MCHC RBC-ENTMCNC: 33.3 GM/DL — SIGNIFICANT CHANGE UP (ref 32–36)
MCV RBC AUTO: 99.5 FL — SIGNIFICANT CHANGE UP (ref 80–100)
NRBC # BLD: 0 /100 WBCS — SIGNIFICANT CHANGE UP (ref 0–0)
PLATELET # BLD AUTO: 261 K/UL — SIGNIFICANT CHANGE UP (ref 150–400)
POTASSIUM SERPL-MCNC: 4.2 MMOL/L — SIGNIFICANT CHANGE UP (ref 3.5–5.3)
POTASSIUM SERPL-SCNC: 4.2 MMOL/L — SIGNIFICANT CHANGE UP (ref 3.5–5.3)
RBC # BLD: 3.92 M/UL — LOW (ref 4.2–5.8)
RBC # FLD: 14.7 % — HIGH (ref 10.3–14.5)
SODIUM SERPL-SCNC: 140 MMOL/L — SIGNIFICANT CHANGE UP (ref 135–145)
WBC # BLD: 10.25 K/UL — SIGNIFICANT CHANGE UP (ref 3.8–10.5)
WBC # FLD AUTO: 10.25 K/UL — SIGNIFICANT CHANGE UP (ref 3.8–10.5)

## 2023-06-06 PROCEDURE — C1732: CPT

## 2023-06-06 PROCEDURE — 86900 BLOOD TYPING SEROLOGIC ABO: CPT

## 2023-06-06 PROCEDURE — 85027 COMPLETE CBC AUTOMATED: CPT

## 2023-06-06 PROCEDURE — 80048 BASIC METABOLIC PNL TOTAL CA: CPT

## 2023-06-06 PROCEDURE — C1769: CPT

## 2023-06-06 PROCEDURE — 86850 RBC ANTIBODY SCREEN: CPT

## 2023-06-06 PROCEDURE — 93654 COMPRE EP EVAL TX VT: CPT

## 2023-06-06 PROCEDURE — 86901 BLOOD TYPING SEROLOGIC RH(D): CPT

## 2023-06-06 PROCEDURE — 93005 ELECTROCARDIOGRAM TRACING: CPT

## 2023-06-06 PROCEDURE — C1766: CPT

## 2023-06-06 PROCEDURE — 93010 ELECTROCARDIOGRAM REPORT: CPT

## 2023-06-06 PROCEDURE — C1894: CPT

## 2023-06-06 RX ORDER — ACETAMINOPHEN 500 MG
650 TABLET ORAL ONCE
Refills: 0 | Status: COMPLETED | OUTPATIENT
Start: 2023-06-06 | End: 2023-06-06

## 2023-06-06 RX ORDER — ACETAMINOPHEN 500 MG
1000 TABLET ORAL ONCE
Refills: 0 | Status: COMPLETED | OUTPATIENT
Start: 2023-06-06 | End: 2023-06-06

## 2023-06-06 RX ORDER — ACETAMINOPHEN 500 MG
650 TABLET ORAL ONCE
Refills: 0 | Status: DISCONTINUED | OUTPATIENT
Start: 2023-06-06 | End: 2023-06-06

## 2023-06-06 RX ADMIN — LOSARTAN POTASSIUM 50 MILLIGRAM(S): 100 TABLET, FILM COATED ORAL at 06:14

## 2023-06-06 RX ADMIN — Medication 81 MILLIGRAM(S): at 06:14

## 2023-06-06 RX ADMIN — Medication 650 MILLIGRAM(S): at 10:07

## 2023-06-06 RX ADMIN — PRIMIDONE 250 MILLIGRAM(S): 250 TABLET ORAL at 06:14

## 2023-06-06 RX ADMIN — Medication 25 MILLIGRAM(S): at 06:14

## 2023-06-06 RX ADMIN — Medication 1000 MILLIGRAM(S): at 02:56

## 2023-06-06 RX ADMIN — Medication 650 MILLIGRAM(S): at 09:37

## 2023-06-06 NOTE — DISCHARGE NOTE NURSING/CASE MANAGEMENT/SOCIAL WORK - PATIENT PORTAL LINK FT
You can access the FollowMyHealth Patient Portal offered by [facility] by registering at the following website: [email or web address]. By joining [facility]’s FollowMyHealth portal, you will also be able to view your health information using other applications (apps) compatible with our system.

## 2023-06-20 ENCOUNTER — NON-APPOINTMENT (OUTPATIENT)
Age: 78
End: 2023-06-20

## 2023-06-20 ENCOUNTER — APPOINTMENT (OUTPATIENT)
Dept: CARDIOLOGY | Facility: CLINIC | Age: 78
End: 2023-06-20
Payer: MEDICARE

## 2023-06-20 VITALS
BODY MASS INDEX: 29.66 KG/M2 | HEIGHT: 67 IN | OXYGEN SATURATION: 99 % | HEART RATE: 131 BPM | SYSTOLIC BLOOD PRESSURE: 136 MMHG | DIASTOLIC BLOOD PRESSURE: 83 MMHG | WEIGHT: 189 LBS

## 2023-06-20 DIAGNOSIS — R00.1 BRADYCARDIA, UNSPECIFIED: ICD-10-CM

## 2023-06-20 PROBLEM — Z86.79 PERSONAL HISTORY OF OTHER DISEASES OF THE CIRCULATORY SYSTEM: Chronic | Status: ACTIVE | Noted: 2023-05-09

## 2023-06-20 PROBLEM — I25.10 ATHEROSCLEROTIC HEART DISEASE OF NATIVE CORONARY ARTERY WITHOUT ANGINA PECTORIS: Chronic | Status: ACTIVE | Noted: 2023-05-09

## 2023-06-20 PROBLEM — I49.3 VENTRICULAR PREMATURE DEPOLARIZATION: Chronic | Status: ACTIVE | Noted: 2023-05-09

## 2023-06-20 PROCEDURE — 99214 OFFICE O/P EST MOD 30 MIN: CPT

## 2023-06-20 PROCEDURE — 93000 ELECTROCARDIOGRAM COMPLETE: CPT

## 2023-06-21 PROBLEM — R00.1 BRADYCARDIA: Status: ACTIVE | Noted: 2019-05-06

## 2023-06-21 NOTE — HISTORY OF PRESENT ILLNESS
[FreeTextEntry1] : Toi is s/p PVC ablation. His only complaint now is fatigue. WOndering if he can start exercise.

## 2023-06-21 NOTE — DISCUSSION/SUMMARY
[FreeTextEntry1] : The patient is a 78-year-old gentleman spinal stenosis, RLE weakness, HTN, HLD,  CAD , PVC s/p PVC ablation with faituge\par #1 CV- c/w aspirin, no angina or HF, nl EF, s/p ablation, decrease toprol to 25mg daily and reevaluate symptoms.\par #2 HTN- c/w losartan 50/12.5mg\par #3 HLD- c/w atorvastatin\par #4 Neuro- RLE weakness improved, f/u Dr. Tolbert, on primidone and cymbalta\par #5 Gout- c/w allopurinol\par #6 - Received Pfizer vaccines. On tamsulosin.Encouraged to start regular daily exercise and strength training. \par \par  [EKG obtained to assist in diagnosis and management of assessed problem(s)] : EKG obtained to assist in diagnosis and management of assessed problem(s)

## 2023-06-21 NOTE — REVIEW OF SYSTEMS
[Weight Loss (___ Lbs)] : no recent weight loss [SOB] : no shortness of breath [Dyspnea on exertion] : not dyspnea during exertion [Chest Discomfort] : no chest discomfort [Lower Ext Edema] : no extremity edema [Leg Claudication] : no intermittent leg claudication [Palpitations] : no palpitations [Syncope] : no syncope

## 2023-06-23 LAB
CHOLEST SERPL-MCNC: 139 MG/DL
ESTIMATED AVERAGE GLUCOSE: 114 MG/DL
HBA1C MFR BLD HPLC: 5.6 %
HDLC SERPL-MCNC: 42 MG/DL
LDLC SERPL CALC-MCNC: 72 MG/DL
NONHDLC SERPL-MCNC: 97 MG/DL
TRIGL SERPL-MCNC: 126 MG/DL

## 2023-07-18 ENCOUNTER — NON-APPOINTMENT (OUTPATIENT)
Age: 78
End: 2023-07-18

## 2023-07-18 ENCOUNTER — APPOINTMENT (OUTPATIENT)
Dept: ELECTROPHYSIOLOGY | Facility: CLINIC | Age: 78
End: 2023-07-18
Payer: MEDICARE

## 2023-07-18 VITALS
SYSTOLIC BLOOD PRESSURE: 120 MMHG | DIASTOLIC BLOOD PRESSURE: 75 MMHG | HEIGHT: 67 IN | HEART RATE: 62 BPM | OXYGEN SATURATION: 99 %

## 2023-07-18 DIAGNOSIS — I49.8 OTHER SPECIFIED CARDIAC ARRHYTHMIAS: ICD-10-CM

## 2023-07-18 PROCEDURE — 99213 OFFICE O/P EST LOW 20 MIN: CPT

## 2023-07-18 PROCEDURE — 93000 ELECTROCARDIOGRAM COMPLETE: CPT

## 2023-07-18 NOTE — DISCUSSION/SUMMARY
[FreeTextEntry1] : 78 year old man with frequent ventricular ectopy.  On 6/5/2023 he underwent EP testing and ablation (Successful ablation of PVC mapped to the LV summit with RF delivered in the endocardial LV, left coronary cusp, and great cardiac vein). He is doing well post ablation. Follow up as needed.

## 2023-07-18 NOTE — CARDIOLOGY SUMMARY
[de-identified] : today: SR.\par 2/9/2023 ZANE SNIDER [de-identified] : Abimael XT 2/9-2/12/2023\par VE burden of 28.9% [de-identified] : TTE 2/7/2019\par Normal LV internal dimensions and wall thicknesses\par Grossly normal LV systolic function\par Grossly normal RV systolic function\par  [de-identified] : 6/5/2023\par Successful ablation of PVC mapped to the LV summit with RF delivered in the endocadial LV, left coronary cusp, and great cardiac vein  [de-identified] : 2/6/20019\par atherectomy of mid RCA and CHAITANYA

## 2023-07-18 NOTE — REVIEW OF SYSTEMS
[Tremor] : a tremor was seen [Negative] : Heme/Lymph [FreeTextEntry9] : back pain [de-identified] : headache

## 2023-07-18 NOTE — HISTORY OF PRESENT ILLNESS
[FreeTextEntry1] : 78 year old man with frequent ventricular ectopy.  On 6/5/2023 he underwent EP testing and ablation (Successful ablation of PVC mapped to the LV summit with RF delivered in the endocadial LV, left coronary cusp, and great cardiac vein).  \par \par He feels "amazing". He has more energy.  He feels well. No chest pain. NO shortness of breath. The groin is well healed.  He has resumed all of his normal activities and is back at the gym.

## 2023-07-18 NOTE — PHYSICAL EXAM
[Well Developed] : well developed [Well Nourished] : well nourished [No Acute Distress] : no acute distress [Normal Conjunctiva] : normal conjunctiva [Normal Venous Pressure] : normal venous pressure [No Carotid Bruit] : no carotid bruit [No Rub] : no rub [No Gallop] : no gallop [Clear Lung Fields] : clear lung fields [Good Air Entry] : good air entry [No Respiratory Distress] : no respiratory distress  [Soft] : abdomen soft [Non Tender] : non-tender [No Masses/organomegaly] : no masses/organomegaly [Normal Bowel Sounds] : normal bowel sounds [Normal Gait] : normal gait [No Edema] : no edema [No Cyanosis] : no cyanosis [No Varicosities] : no varicosities [No Clubbing] : no clubbing [No Rash] : no rash [No Skin Lesions] : no skin lesions [No Focal Deficits] : no focal deficits [Moves all extremities] : moves all extremities [Normal Speech] : normal speech [Alert and Oriented] : alert and oriented [Normal memory] : normal memory [de-identified] : 2/6 systolic murmur [de-identified] : premature beats

## 2023-10-02 ENCOUNTER — RX RENEWAL (OUTPATIENT)
Age: 78
End: 2023-10-02

## 2023-10-02 RX ORDER — LOSARTAN POTASSIUM AND HYDROCHLOROTHIAZIDE 12.5; 5 MG/1; MG/1
50-12.5 TABLET ORAL DAILY
Qty: 90 | Refills: 3 | Status: ACTIVE | COMMUNITY
Start: 2019-05-14 | End: 1900-01-01

## 2023-10-24 ENCOUNTER — NON-APPOINTMENT (OUTPATIENT)
Age: 78
End: 2023-10-24

## 2023-10-24 ENCOUNTER — APPOINTMENT (OUTPATIENT)
Dept: CARDIOLOGY | Facility: HOSPITAL | Age: 78
End: 2023-10-24
Payer: MEDICARE

## 2023-10-24 VITALS
WEIGHT: 193 LBS | SYSTOLIC BLOOD PRESSURE: 134 MMHG | HEART RATE: 82 BPM | BODY MASS INDEX: 30.29 KG/M2 | OXYGEN SATURATION: 96 % | DIASTOLIC BLOOD PRESSURE: 79 MMHG | HEIGHT: 67 IN

## 2023-10-24 DIAGNOSIS — E78.5 HYPERLIPIDEMIA, UNSPECIFIED: ICD-10-CM

## 2023-10-24 PROCEDURE — 99213 OFFICE O/P EST LOW 20 MIN: CPT

## 2023-10-24 PROCEDURE — 93000 ELECTROCARDIOGRAM COMPLETE: CPT

## 2023-10-24 RX ORDER — METOPROLOL SUCCINATE 25 MG/1
25 TABLET, EXTENDED RELEASE ORAL DAILY
Qty: 90 | Refills: 3 | Status: DISCONTINUED | COMMUNITY
Start: 2020-12-08 | End: 2023-10-24

## 2023-10-31 ENCOUNTER — APPOINTMENT (OUTPATIENT)
Dept: CARDIOLOGY | Facility: CLINIC | Age: 78
End: 2023-10-31

## 2024-03-24 NOTE — PHYSICAL EXAM
[Well Developed] : well developed [Well Nourished] : well nourished [No Acute Distress] : no acute distress [Normal Conjunctiva] : normal conjunctiva [Normal Venous Pressure] : normal venous pressure [No Carotid Bruit] : no carotid bruit [No Murmur] : no murmur [Normal S1, S2] : normal S1, S2 [No Gallop] : no gallop [No Rub] : no rub [Good Air Entry] : good air entry [Clear Lung Fields] : clear lung fields [No Respiratory Distress] : no respiratory distress  [Soft] : abdomen soft [No Masses/organomegaly] : no masses/organomegaly [Non Tender] : non-tender [Normal Bowel Sounds] : normal bowel sounds [Normal Gait] : normal gait [No Edema] : no edema [No Cyanosis] : no cyanosis [No Clubbing] : no clubbing [No Varicosities] : no varicosities [No Rash] : no rash [Moves all extremities] : moves all extremities [No Skin Lesions] : no skin lesions [Normal Speech] : normal speech [No Focal Deficits] : no focal deficits [Normal memory] : normal memory [Alert and Oriented] : alert and oriented

## 2024-03-26 ENCOUNTER — APPOINTMENT (OUTPATIENT)
Dept: CARDIOLOGY | Facility: CLINIC | Age: 79
End: 2024-03-26
Payer: MEDICARE

## 2024-03-26 ENCOUNTER — NON-APPOINTMENT (OUTPATIENT)
Age: 79
End: 2024-03-26

## 2024-03-26 VITALS
OXYGEN SATURATION: 96 % | WEIGHT: 185 LBS | DIASTOLIC BLOOD PRESSURE: 80 MMHG | HEART RATE: 79 BPM | SYSTOLIC BLOOD PRESSURE: 125 MMHG | BODY MASS INDEX: 29.03 KG/M2 | HEIGHT: 67 IN

## 2024-03-26 DIAGNOSIS — I49.3 VENTRICULAR PREMATURE DEPOLARIZATION: ICD-10-CM

## 2024-03-26 DIAGNOSIS — I10 ESSENTIAL (PRIMARY) HYPERTENSION: ICD-10-CM

## 2024-03-26 DIAGNOSIS — I25.10 ATHEROSCLEROTIC HEART DISEASE OF NATIVE CORONARY ARTERY W/OUT ANGINA PECTORIS: ICD-10-CM

## 2024-03-26 PROCEDURE — 99214 OFFICE O/P EST MOD 30 MIN: CPT

## 2024-03-26 PROCEDURE — 93000 ELECTROCARDIOGRAM COMPLETE: CPT

## 2024-03-26 PROCEDURE — G2211 COMPLEX E/M VISIT ADD ON: CPT

## 2024-03-26 NOTE — DISCUSSION/SUMMARY
[FreeTextEntry1] : The patient is a 79-year-old gentleman spinal stenosis, RLE weakness, HTN, HLD,  CAD , PVC s/p PVC ablation s/p COVID pneumonia.  #1 CV- c/w aspirin, no angina or HF, nl EF, s/p ablation, off toprol #2 HTN- c/w losartan 50/12.5mg #3 HLD- c/w atorvastatin #4 Neuro- RLE weakness improved, f/u Dr. Tolbert, on primidone and cymbalta #5 Gout- c/w allopurinol #6 - Received Pfizer vaccines. On tamsulosin. #7 Pulm- s/p COVID pneumonia.  [EKG obtained to assist in diagnosis and management of assessed problem(s)] : EKG obtained to assist in diagnosis and management of assessed problem(s)

## 2024-03-26 NOTE — REVIEW OF SYSTEMS
[Weight Gain (___ Lbs)] : [unfilled] ~Ulb weight gain [Negative] : Heme/Lymph [Weight Loss (___ Lbs)] : no recent weight loss [SOB] : no shortness of breath [Dyspnea on exertion] : not dyspnea during exertion [Chest Discomfort] : no chest discomfort [Lower Ext Edema] : no extremity edema [Leg Claudication] : no intermittent leg claudication [Palpitations] : no palpitations [Syncope] : no syncope

## 2024-03-26 NOTE — HISTORY OF PRESENT ILLNESS
[FreeTextEntry1] : Toi has never felt better since the ablation. Could not walk 100 feet and now can walk the whole boardwalk without difficulty. Bicycles five miles every other day and feels great.

## 2024-09-30 ENCOUNTER — NON-APPOINTMENT (OUTPATIENT)
Age: 79
End: 2024-09-30

## 2024-10-01 ENCOUNTER — NON-APPOINTMENT (OUTPATIENT)
Age: 79
End: 2024-10-01

## 2024-10-01 ENCOUNTER — APPOINTMENT (OUTPATIENT)
Dept: CARDIOLOGY | Facility: CLINIC | Age: 79
End: 2024-10-01
Payer: MEDICARE

## 2024-10-01 VITALS
HEART RATE: 76 BPM | OXYGEN SATURATION: 95 % | SYSTOLIC BLOOD PRESSURE: 157 MMHG | HEIGHT: 67 IN | DIASTOLIC BLOOD PRESSURE: 80 MMHG

## 2024-10-01 DIAGNOSIS — I10 ESSENTIAL (PRIMARY) HYPERTENSION: ICD-10-CM

## 2024-10-01 DIAGNOSIS — R00.1 BRADYCARDIA, UNSPECIFIED: ICD-10-CM

## 2024-10-01 DIAGNOSIS — I25.10 ATHEROSCLEROTIC HEART DISEASE OF NATIVE CORONARY ARTERY W/OUT ANGINA PECTORIS: ICD-10-CM

## 2024-10-01 PROCEDURE — 99214 OFFICE O/P EST MOD 30 MIN: CPT

## 2024-10-01 PROCEDURE — G2211 COMPLEX E/M VISIT ADD ON: CPT

## 2024-10-01 PROCEDURE — 93000 ELECTROCARDIOGRAM COMPLETE: CPT

## 2024-10-01 NOTE — DISCUSSION/SUMMARY
[FreeTextEntry1] : The patient is a 79-year-old gentleman spinal stenosis, RLE weakness, HTN, HLD, CAD , PVC s/p PVC ablation who is asymptomatic.  #1 CV- c/w aspirin, no angina or HF, nl EF, s/p ablation, off toprol #2 HTN- c/w losartan 50/12.5mg #3 HLD- c/w atorvastatin #4 Neuro- RLE weakness improved, f/u Dr. Tolbert, on primidone and cymbalta but will stop it on his own #5 Gout- c/w allopurinol #6 - Received Pfizer vaccines. On tamsulosin. #7 General- encouraged to continue walking, stress secondary to sister with dementia., labs today  [EKG obtained to assist in diagnosis and management of assessed problem(s)] : EKG obtained to assist in diagnosis and management of assessed problem(s)

## 2024-10-01 NOTE — HISTORY OF PRESENT ILLNESS
[FreeTextEntry1] : Normal tries to walk down to the water for exercise but some limitations from knee. He is under stress with sister who now had dementia. No CP, palpitations, or SOB.

## 2024-10-02 ENCOUNTER — RX RENEWAL (OUTPATIENT)
Age: 79
End: 2024-10-02

## 2024-10-02 LAB
25(OH)D3 SERPL-MCNC: 52.3 NG/ML
ALBUMIN SERPL ELPH-MCNC: 4.2 G/DL
ALP BLD-CCNC: 208 U/L
ALT SERPL-CCNC: 23 U/L
ANION GAP SERPL CALC-SCNC: 14 MMOL/L
AST SERPL-CCNC: 28 U/L
BILIRUB SERPL-MCNC: 0.3 MG/DL
BUN SERPL-MCNC: 25 MG/DL
CALCIUM SERPL-MCNC: 10.5 MG/DL
CHLORIDE SERPL-SCNC: 101 MMOL/L
CHOLEST SERPL-MCNC: 145 MG/DL
CO2 SERPL-SCNC: 26 MMOL/L
CREAT SERPL-MCNC: 1.34 MG/DL
EGFR: 54 ML/MIN/1.73M2
ESTIMATED AVERAGE GLUCOSE: 105 MG/DL
GLUCOSE SERPL-MCNC: 85 MG/DL
HBA1C MFR BLD HPLC: 5.3 %
HCT VFR BLD CALC: 42.6 %
HDLC SERPL-MCNC: 57 MG/DL
HGB BLD-MCNC: 13.9 G/DL
LDLC SERPL CALC-MCNC: 71 MG/DL
MCHC RBC-ENTMCNC: 32.6 GM/DL
MCHC RBC-ENTMCNC: 33 PG
MCV RBC AUTO: 101.2 FL
NONHDLC SERPL-MCNC: 88 MG/DL
PLATELET # BLD AUTO: 228 K/UL
POTASSIUM SERPL-SCNC: 4.6 MMOL/L
PROT SERPL-MCNC: 6.7 G/DL
RBC # BLD: 4.21 M/UL
RBC # FLD: 14.8 %
SODIUM SERPL-SCNC: 142 MMOL/L
TRIGL SERPL-MCNC: 90 MG/DL
WBC # FLD AUTO: 8.41 K/UL

## 2025-04-03 ENCOUNTER — NON-APPOINTMENT (OUTPATIENT)
Age: 80
End: 2025-04-03

## 2025-04-03 ENCOUNTER — APPOINTMENT (OUTPATIENT)
Dept: CARDIOLOGY | Facility: CLINIC | Age: 80
End: 2025-04-03
Payer: MEDICARE

## 2025-04-03 VITALS
OXYGEN SATURATION: 97 % | WEIGHT: 173 LBS | SYSTOLIC BLOOD PRESSURE: 147 MMHG | BODY MASS INDEX: 27.1 KG/M2 | HEART RATE: 83 BPM | DIASTOLIC BLOOD PRESSURE: 83 MMHG

## 2025-04-03 DIAGNOSIS — I10 ESSENTIAL (PRIMARY) HYPERTENSION: ICD-10-CM

## 2025-04-03 DIAGNOSIS — E78.5 HYPERLIPIDEMIA, UNSPECIFIED: ICD-10-CM

## 2025-04-03 DIAGNOSIS — I25.10 ATHEROSCLEROTIC HEART DISEASE OF NATIVE CORONARY ARTERY W/OUT ANGINA PECTORIS: ICD-10-CM

## 2025-04-03 DIAGNOSIS — I49.3 VENTRICULAR PREMATURE DEPOLARIZATION: ICD-10-CM

## 2025-04-03 PROCEDURE — 99214 OFFICE O/P EST MOD 30 MIN: CPT

## 2025-04-03 PROCEDURE — 93000 ELECTROCARDIOGRAM COMPLETE: CPT

## 2025-04-03 PROCEDURE — G2211 COMPLEX E/M VISIT ADD ON: CPT

## 2025-08-14 ENCOUNTER — RX RENEWAL (OUTPATIENT)
Age: 80
End: 2025-08-14